# Patient Record
Sex: FEMALE | Race: ASIAN | Employment: OTHER | URBAN - METROPOLITAN AREA
[De-identification: names, ages, dates, MRNs, and addresses within clinical notes are randomized per-mention and may not be internally consistent; named-entity substitution may affect disease eponyms.]

---

## 2020-08-19 ENCOUNTER — APPOINTMENT (OUTPATIENT)
Dept: GENERAL RADIOLOGY | Facility: CLINIC | Age: 80
DRG: 286 | End: 2020-08-19
Attending: EMERGENCY MEDICINE
Payer: COMMERCIAL

## 2020-08-19 ENCOUNTER — HOSPITAL ENCOUNTER (INPATIENT)
Facility: CLINIC | Age: 80
LOS: 8 days | Discharge: HOME HEALTH | DRG: 286 | End: 2020-08-27
Attending: EMERGENCY MEDICINE | Admitting: INTERNAL MEDICINE
Payer: COMMERCIAL

## 2020-08-19 ENCOUNTER — APPOINTMENT (OUTPATIENT)
Dept: ULTRASOUND IMAGING | Facility: CLINIC | Age: 80
DRG: 286 | End: 2020-08-19
Attending: PHYSICIAN ASSISTANT
Payer: COMMERCIAL

## 2020-08-19 DIAGNOSIS — I42.8 OTHER CARDIOMYOPATHY (H): Primary | ICD-10-CM

## 2020-08-19 DIAGNOSIS — I42.8 OTHER CARDIOMYOPATHY (H): ICD-10-CM

## 2020-08-19 DIAGNOSIS — I50.9 ACUTE HEART FAILURE, UNSPECIFIED HEART FAILURE TYPE (H): ICD-10-CM

## 2020-08-19 DIAGNOSIS — I48.91 ATRIAL FIBRILLATION, UNSPECIFIED TYPE (H): ICD-10-CM

## 2020-08-19 DIAGNOSIS — I48.91 ATRIAL FIBRILLATION WITH RVR (H): ICD-10-CM

## 2020-08-19 LAB
ANION GAP SERPL CALCULATED.3IONS-SCNC: 11 MMOL/L (ref 3–14)
BASOPHILS # BLD AUTO: 0 10E9/L (ref 0–0.2)
BASOPHILS NFR BLD AUTO: 0.5 %
BUN SERPL-MCNC: 17 MG/DL (ref 7–30)
CALCIUM SERPL-MCNC: 9 MG/DL (ref 8.5–10.1)
CHLORIDE SERPL-SCNC: 105 MMOL/L (ref 94–109)
CO2 SERPL-SCNC: 22 MMOL/L (ref 20–32)
CREAT SERPL-MCNC: 1.18 MG/DL (ref 0.52–1.04)
DIFFERENTIAL METHOD BLD: ABNORMAL
EOSINOPHIL # BLD AUTO: 0.1 10E9/L (ref 0–0.7)
EOSINOPHIL NFR BLD AUTO: 1.1 %
ERYTHROCYTE [DISTWIDTH] IN BLOOD BY AUTOMATED COUNT: 14.8 % (ref 10–15)
GFR SERPL CREATININE-BSD FRML MDRD: 44 ML/MIN/{1.73_M2}
GLUCOSE SERPL-MCNC: 93 MG/DL (ref 70–99)
HCT VFR BLD AUTO: 41.7 % (ref 35–47)
HGB BLD-MCNC: 13.1 G/DL (ref 11.7–15.7)
IMM GRANULOCYTES # BLD: 0 10E9/L (ref 0–0.4)
IMM GRANULOCYTES NFR BLD: 0.3 %
INTERPRETATION ECG - MUSE: NORMAL
LMWH PPP CHRO-ACNC: 0.41 IU/ML
LYMPHOCYTES # BLD AUTO: 0.9 10E9/L (ref 0.8–5.3)
LYMPHOCYTES NFR BLD AUTO: 14.3 %
MCH RBC QN AUTO: 28.9 PG (ref 26.5–33)
MCHC RBC AUTO-ENTMCNC: 31.4 G/DL (ref 31.5–36.5)
MCV RBC AUTO: 92 FL (ref 78–100)
MONOCYTES # BLD AUTO: 0.5 10E9/L (ref 0–1.3)
MONOCYTES NFR BLD AUTO: 7.9 %
NEUTROPHILS # BLD AUTO: 4.8 10E9/L (ref 1.6–8.3)
NEUTROPHILS NFR BLD AUTO: 75.9 %
NRBC # BLD AUTO: 0 10*3/UL
NRBC BLD AUTO-RTO: 0 /100
NT-PROBNP SERPL-MCNC: ABNORMAL PG/ML (ref 0–1800)
PLATELET # BLD AUTO: 181 10E9/L (ref 150–450)
POTASSIUM SERPL-SCNC: 4.2 MMOL/L (ref 3.4–5.3)
RBC # BLD AUTO: 4.53 10E12/L (ref 3.8–5.2)
SARS-COV-2 RNA SPEC QL NAA+PROBE: NORMAL
SODIUM SERPL-SCNC: 138 MMOL/L (ref 133–144)
SPECIMEN SOURCE: NORMAL
TROPONIN I SERPL-MCNC: 0.29 UG/L (ref 0–0.04)
TROPONIN I SERPL-MCNC: 0.3 UG/L (ref 0–0.04)
TROPONIN I SERPL-MCNC: 0.3 UG/L (ref 0–0.04)
WBC # BLD AUTO: 6.4 10E9/L (ref 4–11)

## 2020-08-19 PROCEDURE — 83880 ASSAY OF NATRIURETIC PEPTIDE: CPT | Performed by: EMERGENCY MEDICINE

## 2020-08-19 PROCEDURE — 93970 EXTREMITY STUDY: CPT

## 2020-08-19 PROCEDURE — 96374 THER/PROPH/DIAG INJ IV PUSH: CPT

## 2020-08-19 PROCEDURE — 25000132 ZZH RX MED GY IP 250 OP 250 PS 637: Performed by: INTERNAL MEDICINE

## 2020-08-19 PROCEDURE — 36415 COLL VENOUS BLD VENIPUNCTURE: CPT | Performed by: PHYSICIAN ASSISTANT

## 2020-08-19 PROCEDURE — 96375 TX/PRO/DX INJ NEW DRUG ADDON: CPT

## 2020-08-19 PROCEDURE — 99285 EMERGENCY DEPT VISIT HI MDM: CPT | Mod: 25

## 2020-08-19 PROCEDURE — 12000000 ZZH R&B MED SURG/OB

## 2020-08-19 PROCEDURE — 80048 BASIC METABOLIC PNL TOTAL CA: CPT | Performed by: EMERGENCY MEDICINE

## 2020-08-19 PROCEDURE — 25000128 H RX IP 250 OP 636: Performed by: PHYSICIAN ASSISTANT

## 2020-08-19 PROCEDURE — 85520 HEPARIN ASSAY: CPT | Performed by: INTERNAL MEDICINE

## 2020-08-19 PROCEDURE — 25000128 H RX IP 250 OP 636: Performed by: INTERNAL MEDICINE

## 2020-08-19 PROCEDURE — 99223 1ST HOSP IP/OBS HIGH 75: CPT | Mod: AI | Performed by: INTERNAL MEDICINE

## 2020-08-19 PROCEDURE — 84484 ASSAY OF TROPONIN QUANT: CPT | Performed by: PHYSICIAN ASSISTANT

## 2020-08-19 PROCEDURE — 36415 COLL VENOUS BLD VENIPUNCTURE: CPT | Performed by: INTERNAL MEDICINE

## 2020-08-19 PROCEDURE — 25000125 ZZHC RX 250: Performed by: EMERGENCY MEDICINE

## 2020-08-19 PROCEDURE — 25000128 H RX IP 250 OP 636: Performed by: EMERGENCY MEDICINE

## 2020-08-19 PROCEDURE — 25000132 ZZH RX MED GY IP 250 OP 250 PS 637: Performed by: PHYSICIAN ASSISTANT

## 2020-08-19 PROCEDURE — 71045 X-RAY EXAM CHEST 1 VIEW: CPT

## 2020-08-19 PROCEDURE — 93005 ELECTROCARDIOGRAM TRACING: CPT

## 2020-08-19 PROCEDURE — 99222 1ST HOSP IP/OBS MODERATE 55: CPT | Performed by: INTERNAL MEDICINE

## 2020-08-19 PROCEDURE — U0003 INFECTIOUS AGENT DETECTION BY NUCLEIC ACID (DNA OR RNA); SEVERE ACUTE RESPIRATORY SYNDROME CORONAVIRUS 2 (SARS-COV-2) (CORONAVIRUS DISEASE [COVID-19]), AMPLIFIED PROBE TECHNIQUE, MAKING USE OF HIGH THROUGHPUT TECHNOLOGIES AS DESCRIBED BY CMS-2020-01-R: HCPCS | Performed by: EMERGENCY MEDICINE

## 2020-08-19 PROCEDURE — 84484 ASSAY OF TROPONIN QUANT: CPT | Performed by: EMERGENCY MEDICINE

## 2020-08-19 PROCEDURE — C9803 HOPD COVID-19 SPEC COLLECT: HCPCS

## 2020-08-19 PROCEDURE — 99207 ZZC APP CREDIT; MD BILLING SHARED VISIT: CPT | Performed by: PHYSICIAN ASSISTANT

## 2020-08-19 PROCEDURE — 85025 COMPLETE CBC W/AUTO DIFF WBC: CPT | Performed by: EMERGENCY MEDICINE

## 2020-08-19 PROCEDURE — 25000132 ZZH RX MED GY IP 250 OP 250 PS 637: Performed by: EMERGENCY MEDICINE

## 2020-08-19 RX ORDER — FUROSEMIDE 10 MG/ML
40 INJECTION INTRAMUSCULAR; INTRAVENOUS EVERY 12 HOURS
Status: DISCONTINUED | OUTPATIENT
Start: 2020-08-19 | End: 2020-08-21

## 2020-08-19 RX ORDER — POTASSIUM CHLORIDE 7.45 MG/ML
10 INJECTION INTRAVENOUS
Status: DISCONTINUED | OUTPATIENT
Start: 2020-08-19 | End: 2020-08-22

## 2020-08-19 RX ORDER — METOPROLOL TARTRATE 1 MG/ML
2.5 INJECTION, SOLUTION INTRAVENOUS ONCE
Status: DISCONTINUED | OUTPATIENT
Start: 2020-08-19 | End: 2020-08-19

## 2020-08-19 RX ORDER — ATORVASTATIN CALCIUM 80 MG/1
80 TABLET, FILM COATED ORAL EVERY EVENING
COMMUNITY

## 2020-08-19 RX ORDER — ASPIRIN 325 MG
325 TABLET ORAL ONCE
Status: COMPLETED | OUTPATIENT
Start: 2020-08-19 | End: 2020-08-19

## 2020-08-19 RX ORDER — HYDROCHLOROTHIAZIDE 12.5 MG/1
12.5 CAPSULE ORAL EVERY EVENING
Status: ON HOLD | COMMUNITY
End: 2020-08-27

## 2020-08-19 RX ORDER — METOPROLOL TARTRATE 1 MG/ML
2.5 INJECTION, SOLUTION INTRAVENOUS ONCE
Status: COMPLETED | OUTPATIENT
Start: 2020-08-19 | End: 2020-08-19

## 2020-08-19 RX ORDER — NALOXONE HYDROCHLORIDE 0.4 MG/ML
.1-.4 INJECTION, SOLUTION INTRAMUSCULAR; INTRAVENOUS; SUBCUTANEOUS
Status: DISCONTINUED | OUTPATIENT
Start: 2020-08-19 | End: 2020-08-26

## 2020-08-19 RX ORDER — POTASSIUM CL/LIDO/0.9 % NACL 10MEQ/0.1L
10 INTRAVENOUS SOLUTION, PIGGYBACK (ML) INTRAVENOUS
Status: DISCONTINUED | OUTPATIENT
Start: 2020-08-19 | End: 2020-08-22

## 2020-08-19 RX ORDER — METOPROLOL TARTRATE 25 MG/1
25 TABLET, FILM COATED ORAL 2 TIMES DAILY
Status: DISCONTINUED | OUTPATIENT
Start: 2020-08-19 | End: 2020-08-19

## 2020-08-19 RX ORDER — ATORVASTATIN CALCIUM 40 MG/1
80 TABLET, FILM COATED ORAL EVERY EVENING
Status: DISCONTINUED | OUTPATIENT
Start: 2020-08-19 | End: 2020-08-27 | Stop reason: HOSPADM

## 2020-08-19 RX ORDER — LOSARTAN POTASSIUM 100 MG/1
100 TABLET ORAL EVERY EVENING
Status: DISCONTINUED | OUTPATIENT
Start: 2020-08-19 | End: 2020-08-21

## 2020-08-19 RX ORDER — FUROSEMIDE 10 MG/ML
40 INJECTION INTRAMUSCULAR; INTRAVENOUS ONCE
Status: COMPLETED | OUTPATIENT
Start: 2020-08-19 | End: 2020-08-19

## 2020-08-19 RX ORDER — POLYETHYLENE GLYCOL 3350 17 G/17G
17 POWDER, FOR SOLUTION ORAL DAILY PRN
Status: DISCONTINUED | OUTPATIENT
Start: 2020-08-19 | End: 2020-08-27 | Stop reason: HOSPADM

## 2020-08-19 RX ORDER — CLOPIDOGREL BISULFATE 75 MG/1
75 TABLET ORAL DAILY
Status: DISCONTINUED | OUTPATIENT
Start: 2020-08-19 | End: 2020-08-19

## 2020-08-19 RX ORDER — POTASSIUM CHLORIDE 29.8 MG/ML
20 INJECTION INTRAVENOUS
Status: DISCONTINUED | OUTPATIENT
Start: 2020-08-19 | End: 2020-08-22

## 2020-08-19 RX ORDER — METOPROLOL SUCCINATE 25 MG/1
25 TABLET, EXTENDED RELEASE ORAL 2 TIMES DAILY
Status: DISCONTINUED | OUTPATIENT
Start: 2020-08-19 | End: 2020-08-25

## 2020-08-19 RX ORDER — ONDANSETRON 2 MG/ML
4 INJECTION INTRAMUSCULAR; INTRAVENOUS EVERY 6 HOURS PRN
Status: DISCONTINUED | OUTPATIENT
Start: 2020-08-19 | End: 2020-08-27 | Stop reason: HOSPADM

## 2020-08-19 RX ORDER — AMOXICILLIN 250 MG
2 CAPSULE ORAL 2 TIMES DAILY PRN
Status: DISCONTINUED | OUTPATIENT
Start: 2020-08-19 | End: 2020-08-27 | Stop reason: HOSPADM

## 2020-08-19 RX ORDER — ASPIRIN 81 MG/1
81 TABLET ORAL DAILY
COMMUNITY

## 2020-08-19 RX ORDER — FLUTICASONE PROPIONATE 50 MCG
2 SPRAY, SUSPENSION (ML) NASAL DAILY
COMMUNITY

## 2020-08-19 RX ORDER — AMOXICILLIN 250 MG
1 CAPSULE ORAL 2 TIMES DAILY PRN
Status: DISCONTINUED | OUTPATIENT
Start: 2020-08-19 | End: 2020-08-27 | Stop reason: HOSPADM

## 2020-08-19 RX ORDER — CLOPIDOGREL BISULFATE 75 MG/1
75 TABLET ORAL DAILY
Status: ON HOLD | COMMUNITY
End: 2020-08-27

## 2020-08-19 RX ORDER — HEPARIN SODIUM 10000 [USP'U]/100ML
350 INJECTION, SOLUTION INTRAVENOUS CONTINUOUS
Status: DISPENSED | OUTPATIENT
Start: 2020-08-19 | End: 2020-08-26

## 2020-08-19 RX ORDER — METOPROLOL TARTRATE 1 MG/ML
5 INJECTION, SOLUTION INTRAVENOUS EVERY 4 HOURS PRN
Status: DISCONTINUED | OUTPATIENT
Start: 2020-08-19 | End: 2020-08-27 | Stop reason: HOSPADM

## 2020-08-19 RX ORDER — LIDOCAINE 40 MG/G
CREAM TOPICAL
Status: DISCONTINUED | OUTPATIENT
Start: 2020-08-19 | End: 2020-08-26

## 2020-08-19 RX ORDER — POTASSIUM CHLORIDE 1.5 G/1.58G
20-40 POWDER, FOR SOLUTION ORAL
Status: DISCONTINUED | OUTPATIENT
Start: 2020-08-19 | End: 2020-08-22

## 2020-08-19 RX ORDER — ASPIRIN 81 MG/1
81 TABLET ORAL DAILY
Status: DISCONTINUED | OUTPATIENT
Start: 2020-08-20 | End: 2020-08-27 | Stop reason: HOSPADM

## 2020-08-19 RX ORDER — ASCORBIC ACID 500 MG
500 TABLET ORAL DAILY
COMMUNITY

## 2020-08-19 RX ORDER — LOSARTAN POTASSIUM 100 MG/1
100 TABLET ORAL EVERY EVENING
Status: ON HOLD | COMMUNITY
End: 2020-08-27

## 2020-08-19 RX ORDER — ACETAMINOPHEN 325 MG/1
325-650 TABLET ORAL EVERY 6 HOURS PRN
COMMUNITY

## 2020-08-19 RX ORDER — METOPROLOL SUCCINATE 25 MG/1
25 TABLET, EXTENDED RELEASE ORAL EVERY EVENING
Status: ON HOLD | COMMUNITY
End: 2020-08-27

## 2020-08-19 RX ORDER — ACETAMINOPHEN 325 MG/1
650 TABLET ORAL EVERY 4 HOURS PRN
Status: DISCONTINUED | OUTPATIENT
Start: 2020-08-19 | End: 2020-08-22

## 2020-08-19 RX ORDER — ONDANSETRON 4 MG/1
4 TABLET, ORALLY DISINTEGRATING ORAL EVERY 6 HOURS PRN
Status: DISCONTINUED | OUTPATIENT
Start: 2020-08-19 | End: 2020-08-27 | Stop reason: HOSPADM

## 2020-08-19 RX ORDER — POTASSIUM CHLORIDE 1500 MG/1
20-40 TABLET, EXTENDED RELEASE ORAL
Status: DISCONTINUED | OUTPATIENT
Start: 2020-08-19 | End: 2020-08-22

## 2020-08-19 RX ADMIN — Medication 12.5 MG: at 16:34

## 2020-08-19 RX ADMIN — FUROSEMIDE 40 MG: 10 INJECTION, SOLUTION INTRAMUSCULAR; INTRAVENOUS at 22:14

## 2020-08-19 RX ADMIN — FUROSEMIDE 40 MG: 10 INJECTION, SOLUTION INTRAMUSCULAR; INTRAVENOUS at 12:02

## 2020-08-19 RX ADMIN — ACETAMINOPHEN 650 MG: 325 TABLET, FILM COATED ORAL at 19:09

## 2020-08-19 RX ADMIN — Medication 3000 UNITS: at 13:41

## 2020-08-19 RX ADMIN — METOPROLOL SUCCINATE 25 MG: 25 TABLET, EXTENDED RELEASE ORAL at 22:14

## 2020-08-19 RX ADMIN — ASPIRIN 325 MG ORAL TABLET 325 MG: 325 PILL ORAL at 12:02

## 2020-08-19 RX ADMIN — METOPROLOL TARTRATE 2.5 MG: 5 INJECTION INTRAVENOUS at 12:02

## 2020-08-19 RX ADMIN — ATORVASTATIN CALCIUM 80 MG: 40 TABLET, FILM COATED ORAL at 19:09

## 2020-08-19 RX ADMIN — LOSARTAN POTASSIUM 100 MG: 100 TABLET, FILM COATED ORAL at 19:09

## 2020-08-19 RX ADMIN — HEPARIN SODIUM 600 UNITS/HR: 10000 INJECTION, SOLUTION INTRAVENOUS at 13:41

## 2020-08-19 ASSESSMENT — ENCOUNTER SYMPTOMS
SORE THROAT: 1
SHORTNESS OF BREATH: 1
COUGH: 1
FEVER: 0

## 2020-08-19 ASSESSMENT — ACTIVITIES OF DAILY LIVING (ADL)
ADLS_ACUITY_SCORE: 15
ADLS_ACUITY_SCORE: 15

## 2020-08-19 ASSESSMENT — MIFFLIN-ST. JEOR: SCORE: 1053.5

## 2020-08-19 NOTE — ED NOTES
Assisted Pt. In using commode. Pt. Not lightheaded or dizzy. Pt. Very short of breath with transition from bed to commode and back to bed. Pt. O2 sats at 88% on room air. Once Pt. Back in bed and resting Pt. O2 sats came up to 97%. Applied monitoring devices (EKG, BP, and pulse ox) onto Patient. RN aware

## 2020-08-19 NOTE — CONSULTS
Consult Date:  08/19/2020      CARDIOLOGY CONSULTATION      REQUESTING PHYSICIAN:  Hospitalist Service.      INDICATION FOR CARDIAC CONSULTATION:  Atrial fibrillation, new onset; congestive heart failure.      Dear Doctor:        It is my pleasure to see your patient, Noelle Mullins, who is normally followed at [facility] for cardiology care.  This is a patient who underwent coronary artery bypass grafting in 2010.  She had a LIMA to the LAD, a radial graft to the distal right coronary artery and a vein graft to the obtuse marginal artery.  She had stenting of a high-grade stenosis in the proximal right coronary artery in 2017.  Her last echocardiogram that I can see is also from 2017.  This showed that the patient had normal left ventricular size and systolic function, with an EF of 55%.  She also has moderate concentric left ventricular hypertrophy.  This is also a patient with peripheral arterial disease with bilateral iliac stents.  She also has significant essential hypertension.  The patient has chronic left upper sternal border chest discomfort to palpation.      With that background in mind, the patient has been complaining of increasing lower extremity edema, with the left lower extremity swelling first, followed by the right lower extremity and increasing shortness of breath for 1 week's time.  The right lower extremity began to swell in the last 2 days.  She has noticed her heart has been beating rapidly.  She is not sure how long that has been going on for, but maybe 2 weeks.  She does not notice irregularity of heartbeat.  The patient came into the emergency room and was found to be in atrial fibrillation with rapid ventricular response.  The ventricular rate was 130 beats per minute with PVCs.  The patient has not been complaining of any chest pains or any chest pressure.  The shortness of breath is mainly on exertion.  The first troponin was raised at 0.289.  The proBNP was markedly  raised at 10,189.      Chest x-ray showed mild to moderate left pleural effusion, underlying left base consolidation and mild pulmonary vascular congestion.      PAST MEDICAL HISTORY:  Coronary artery bypass grafting as described above in 2010, stenting of the right coronary artery in 2017,  hypertension, peripheral arterial disease with bilateral iliac artery stenting, gastroesophageal reflux disease, history of supraventricular tachycardia.      PAST SURGICAL HISTORY:  Coronary artery bypass grafting.      FAMILY HISTORY:  Father had hypertension and stroke.  Grandmother had hypertension and stroke.      SOCIAL HISTORY:  She is a former smoker, and quit in 2000.      MEDICATIONS:  Aspirin 325 mg per day, atorvastatin 80 mg per day, clopidogrel 75 mg per day, furosemide 40 mg every 12 hours, intravenous heparin bolus plus infusion, losartan 100 mg every evening, metoprolol tartrate 25 mg twice a day.      ALLERGIES:  SHE HAS COUGH WITH LISINOPRIL AND NONSPECIFIC INTOLERANCE TO OXYCODONE.      REVIEW OF SYSTEMS:   CONSTITUTIONAL:  Negative.   EYES:  Negative.   ENT:  Negative.   CARDIOVASCULAR:  As above.   RESPIRATORY:  As above.   GASTROINTESTINAL:  Nil.   GENITOURINARY:  Nil.   MUSCULOSKELETAL:  Nil.   NEUROLOGICAL:  Nil.   PSYCHIATRIC:  Nil.   ENDOCRINE:  Nil.   HEMATOLYMPHATIC:  Nil.   ALLERGY AND IMMUNOLOGY:  As above.      PHYSICAL EXAMINATION:   GENERAL:  She is a pleasant, elderly patient in no apparent distress.   VITAL SIGNS:  Blood pressure is 121/105.  Her pulse is 101 beats per minute, atrial fibrillation.  Temperature is 98.2.  Respirations are 20.  O2 sats are 97%.   HEENT:  The patient is wearing glasses.  Her pupils are equal.  She has normal facial symmetry.   NECK:  Jugular venous pulse is just visible above the clavicle bilaterally and is borderline raised.  Her carotids are normal with no bruits.  She has a median sternotomy scar and the sternum is stable.   HEART:  Riddle beat is not displaced.   Heart sounds 1 variable, heart sound 2 normal.  No murmurs heard.   CHEST:  Reveals bibasilar crackles.   ABDOMEN:  Unremarkable with no organomegaly.  She has no tenderness.   EXTREMITIES:  She has bilateral lower extremity edema, left greater than the right.  The edema is 2+ at least bilaterally.  There is erythema in the lower extremities.   NEUROLOGIC:  She moves all 4 limbs appropriately with no obvious sensory or motor loss.  She is fully oriented to time, place and person with a pleasant affect.      LABORATORY INVESTIGATIONS:  Sodium is 138, potassium is 4.2, BUN is 17, creatinine is 1.18, GFR is 44.  ProBNP is raised at 10,189.  First troponin raised at 0.289.  White cell count 6.4, hemoglobin 13.1, platelet count is 181,000.      IMPRESSION:   1.  Newly diagnosed atrial fibrillation with rapid ventricular response with uncertain onset and duration.   2.  Congestive heart failure.  This is most likely related to the atrial fibrillation with rapid ventricular response.   3.  Known coronary artery disease and status post coronary artery bypass grafting in 2010 and stenting of the proximal right coronary artery in 2017.   4.  History of essential hypertension.   5.  He is on dual antiplatelet therapy for unknown reasons.      PLAN:     1.  We will try to get the patient rate controlled and I fully agree with anticoagulating with heparin.  I would switch the metoprolol tartrate to metoprolol succinate, which tends to give a longer and better control of heart rate.  We will to try to titrate the beta blocker to control the heart rate and hopefully we will have enough blood pressure to do that.   2.  We will await the results of the echocardiogram to determine left ventricular size and systolic function.  Previously, her EF was normal in 2018.  I fully agree with diuresis as the patient is clearly in congestive heart failure both clinically and based upon chest x-ray and serology with a significantly raised  proBNP.     3.  Finally, if the patient is on dual antiplatelet therapy for the coronary arteries, I would stop the Plavix medication.  However, if the patient is on the Plavix for peripheral arterial disease, I might consider stopping it, but the patient will be on long-term anticoagulation, so I suspect that we would not need the Plavix medication under those circumstances.  Her risk of bleeding is significantly raised being on both aspirin, Plavix and an anticoagulant.      Many thanks for allowing me to be involved in the care of this very nice patient.  We will follow closely.         KAILEY ESCALERA MD, [facility]             D: 2020   T: 2020   MT: BENITO      Name:     ALYX OLIVER   MRN:      [id number]        Account:       [id number]   :      1940           Consult Date:  2020      Document: [id number]       cc: Teresita Espino MD

## 2020-08-19 NOTE — ED NOTES
[facility]  ED Nurse Handoff Report    Noelle Mullins is a 79 year old female   ED Chief complaint: Leg Swelling  . ED Diagnosis:   Final diagnoses:   Atrial fibrillation, unspecified type (H)     Allergies:   Allergies   Allergen Reactions     Lisinopril Cough     Oxycodone        Code Status: Full Code  Activity level - Baseline/Home:  Assist X 1. Activity Level - Current:   Assist X 1. Lift room needed: No. Bariatric: No   Needed: Yes   Isolation: Yes. Infection: Not Applicable.     Vital Signs:   Vitals:    08/19/20 1200 08/19/20 1215 08/19/20 1230 08/19/20 1248   BP: (!) 139/119 (!) 139/116 (!) 147/122    Pulse: 126 99 102    Resp:       Temp:       TempSrc:       SpO2: 97% 94% 97%    Weight:    65.2 kg (143 lb 11.8 oz)   Height:    1.524 m (5')       Cardiac Rhythm:  ,      Pain level:    Patient confused: No. Patient Falls Risk: Yes.   Elimination Status: Has voided   Patient Report - Initial Complaint: Shortness of breath . Focused Assessment: cardiac   Tests Performed:   Labs Ordered and Resulted from Time of ED Arrival Up to the Time of Departure from the ED   CBC WITH PLATELETS DIFFERENTIAL - Abnormal; Notable for the following components:       Result Value    MCHC 31.4 (*)     All other components within normal limits   BASIC METABOLIC PANEL - Abnormal; Notable for the following components:    Creatinine 1.18 (*)     GFR Estimate 44 (*)     GFR Estimate If Black 51 (*)     All other components within normal limits   NT PROBNP INPATIENT - Abnormal; Notable for the following components:    N-Terminal Pro BNP Inpatient 10,189 (*)     All other components within normal limits   TROPONIN I - Abnormal; Notable for the following components:    Troponin I ES 0.289 (*)     All other components within normal limits   COVID-19 VIRUS (CORONAVIRUS) BY PCR   MEASURE WEIGHT   NOTIFY PHYSICIAN   NOTIFY PHYSICIAN   PLATELETS MONITORED PER HEPARIN TREATMENT PROTOCOL (FOR MEANINGFUL USE   .  Abnormal Results: see above .   Treatments provided: ekg labs cardiac monitoring , iv , meds   Family Comments: daughter at bedside aware of admission   OBS brochure/video discussed/provided to patient:  Yes  ED Medications:   Medications   metoprolol (LOPRESSOR) injection 2.5 mg (has no administration in time range)   metoprolol (LOPRESSOR) injection 2.5 mg (2.5 mg Intravenous Given 8/19/20 1202)   furosemide (LASIX) injection 40 mg (40 mg Intravenous Given 8/19/20 1202)   aspirin (ASA) tablet 325 mg (325 mg Oral Given 8/19/20 1202)     Drips infusing:  No  For the majority of the shift, the patient's behavior Green. Interventions performed were labs chest xray , lasix , asa metoprolol .    Sepsis treatment initiated: no       ED Nurse Name/Phone Number: Savita Kraft RN,   12:50 PM      RECEIVING UNIT ED HANDOFF REVIEW    Above ED Nurse Handoff Report was reviewed: Yes  Reviewed by: Princess Whittington RN on August 19, 2020 at 1:28 PM

## 2020-08-19 NOTE — ED NOTES
Patient a/o speaks philipino , speaks and understands some english, very pleasant , fluid overload pt. Feet have plus 3 edema , lungs have crackles bilaterally  desats on room air to high 90's with activity however recovers easily  To sats >90 .

## 2020-08-19 NOTE — ED TRIAGE NOTES
Daughter brings patient in after being at clinic for SOB, sore throat, cough, & leg swelling for 1 week. ABC's intact.

## 2020-08-19 NOTE — PROGRESS NOTES
Physician Attestation   I, Ramirez Andrews, saw and evaluated Noelle Mullins as part of a shared visit.  I have reviewed and discussed with the advanced practice provider their history, physical and plan.    I personally reviewed the vital signs, medications, labs and imaging.    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  Non-toxic and comfortable w/ normal work of breathing on exam.    Key management decisions made by me:     1.  Acute CHF, possible ACS: last tte from 2017 showed EF of 55% w/ LVH (care everywhere)  --agree w/ admission for IV diuresis, heparin gtt, serial troponins and echo as well as cardiology consultation.  --currently chest pain free.   --metoprolol as at home, continue ASA 81 mg, plavix 75 mg, atorvastatin and losartan.   --trend troponins  --monitor on tele    2.  COVID rule out: Patient here with volume overload, 3+ LE edema and pulmonary edema with elevated bnp.  Given clear alternate etiology (CHF) ok to remove special covid isolation and treat swab as asymptomatic from my standpoint.    3.  A-fib: will start metoprolol tartrate 25 mg BID.  Was on toprol XL 25 mg once daily at home.  Titrate.  Revisit AC moving forward.      Ramirez Andrews  Date of Service (when I saw the patient): 08/19/20

## 2020-08-19 NOTE — PROGRESS NOTES
Cardiology consult dictated.  79-year-old patient admitted with symptoms suggestive of congestive heart failure with increasing ankle edema and dyspnea on exertion.  Patient was found to be in atrial fibrillation with rapid ventricular response.  The symptoms have been going on for approximately 2 weeks.  Patient is normally followed at [facility] cardiology.  She has a past history of coronary artery bypass grafting in 2010 with a LIMA to the LAD and a radial graft to the right coronary artery and a saphenous vein graft to the obtuse marginal artery.  All the grafts were open in 2017.  She had stenting to her right coronary artery in 2017.  In 2018 her ejection fraction was normal.  Patient is not complaining of any chest discomfort.  Her troponin is borderline raised at 0.28.  Patient has been started on intravenous heparin and intravenous diuretics.  Her chest x-ray suggested congestive heart failure with pleural effusions and pulmonary vascular congestion.  We are awaiting the results of the echocardiogram.  This patient has peripheral arterial disease with bilateral iliac artery stenting.  Patient is already on dual antiplatelet therapy and I suspect that that is due to peripheral arterial disease.  She will require long-term anticoagulation and the risk of bleeding in the 79-year-old patient being on triple therapy would be significant.  So for that reason we will stop the Plavix medication.  I agree with the use of metoprolol but I would switch to the succinate form for longer acting and smoother rate control and that can be titrated up so long as blood pressure allows and we will continue with the intravenous heparin and switch over to an oral anticoagulant, probably Eliquis when the investigations have been completed.  We will complete the troponin series also.  Will follow.

## 2020-08-19 NOTE — Clinical Note
The DP pulses are 1+ bilaterally. The PT pulses are 1+ bilaterally. The femoral pulses are 1+ bilaterally.

## 2020-08-19 NOTE — H&P
History and Physical     Noelle Mullins MRN# [id number]   YOB: 1940 Age: 79 year old      Date of Admission:  8/19/2020    Primary care provider: Teresita Espino          Assessment and Plan:   Noelle Mullins is a 79 year old female with a PMH significant for CAD s/p CABG in 2011, pSVT, GERD, PVD s/p stent to R common iliac artery, HTN, HLP, who presents with SOB and LE edema.     1. Acute CHF, likely diastolic due to a fib with RVR - 1 week of LE edema, pt reports edema in left leg first, then right. SOB for past week as well, notes heart racing, denies chest pain or pleuritic chest pain. Notes orthopnea since CABG. EKG a fib with RVR, HRs 120s, BNP significantly elevated at 10,000, troponin elevated but likely demand. CXR shows mild-moderate left pleural effusion with underlying left base consolidation/collapse which could simply represent atelectasis, mild pulmonary vascular congestion. HR mildly improved with 2.5 mg IV metoprolol, will continue PRN IV 5mg metoprolol, continue heparin gtt. Will diurese with 40 mg IV lasix BID (lasix naive). Daily weights, strict I&Os. Given new CHF, will get an echocardiogram and consult cardiology. Resume home oral metoprolol as well once med rec is complete. Given initial asymmetric LE edema, will get LE US to rule out DVT.   2. Elevated troponin - likely demand ischemia from a fib and CHF. Denies chest pain although has a hx of CAD (below). Will monitor on tele and trend troponins. Heparin started for above.   3. CAD, s/p 3v CABG in 2010, PCI to RCA in 2017 -pt denies chest pain, SOB due to above. Will get echocardiogram per above and trend troponins. Resume home BB, plavix, ARB and statin.   4. HTN - resume home meds with parameters once med rec is complete, appears to be on losartan, hydrochlorothiazide and metoprolol.   5. HLP - resume home statin  6. PVD - R common iliac stenosis, s/p stent. On Plavix, hold for now as she will be on heparin  for #1.   7. Covid-19 - symptomatic due to cough and SOB. Low suspicion, remove precautions if negative. Pending.     Prophylaxis - on heparin gtt  Code status - Full Code  Dispo - admit to inpatient                Chief Complaint:   SOB, LE edema         History of Present Illness:   Noelle Mullins is a 79 year old female with a PMH significant for CAD s/p CABG in 2011, pSVT, GERD, PVD s/p stent to R common iliac artery, HTN, HLP, who presents with SOB and LE edema. Pt reports 1 week hx of increasing SOB and LE edema. She notes that she feels her heart is racing for the past couple days. She denies chest pain but notes mild cough. She denies fever, chills, abd pain, nausea, vomiting, diarrhea or dysuria. She endorses orthopnea since her CABG many years ago and sleeps with her head propped up. She denies LE pain. She denies sick contacts or recent travel.   In the ED, tachycardic in the 100-130s/ VS otherwise stable, no hypoxia. BMP is significant for mild FATOU with Cr of 1.18, otherwise unremarkable. BNP is significantly elevated at [address], troponin is elevated at 0.289. CBC is unremarkable. EKG shows a fib with RVR. CXR shows mild-moderate left pleural effusion with underlying left base consolidation/collapse which could simply represent atelectasis, mild pulmonary vascular congestion. She received 325 mg PO ASA, 40 mg IV lasix and 2.5 mg IV metoprolol. She will be admitted for further management of a fib and CHF.     Hx obtained by speaking with ED physician, chart review and pt interview.               Past Medical History:   HTN  HLP  CAD  PVD  GERD  pSVT          Past Surgical History:   3v CABG          Social History:     Social History     Socioeconomic History     Marital status:      Spouse name: Not on file     Number of children: Not on file     Years of education: Not on file     Highest education level: Not on file   Occupational History     Not on file   Social Needs     Financial resource  strain: Not on file     Food insecurity     Worry: Not on file     Inability: Not on file     Transportation needs     Medical: Not on file     Non-medical: Not on file   Tobacco Use     Smoking status: Not on file   Substance and Sexual Activity     Alcohol use: Not on file     Drug use: Not on file     Sexual activity: Not on file   Lifestyle     Physical activity     Days per week: Not on file     Minutes per session: Not on file     Stress: Not on file   Relationships     Social connections     Talks on phone: Not on file     Gets together: Not on file     Attends Zoroastrian service: Not on file     Active member of club or organization: Not on file     Attends meetings of clubs or organizations: Not on file     Relationship status: Not on file     Intimate partner violence     Fear of current or ex partner: Not on file     Emotionally abused: Not on file     Physically abused: Not on file     Forced sexual activity: Not on file   Other Topics Concern     Not on file   Social History Narrative     Not on file     Former smoker - quit in 2000          Family History:   Father - HTN  Grandmother - HTN, CVA         Allergies:      Allergies   Allergen Reactions     Lisinopril Cough     Oxycodone                Medications:     Prior to Admission medications    Not on File     Med rec pending         Review of Systems:   A Comprehensive greater than 10 system review of systems was carried out.  Pertinent positives and negatives are noted above.  Otherwise negative for contributory information.     Review Of Systems  Skin: negative  Eyes: negative  Ears/Nose/Throat: negative  Respiratory: No shortness of breath, dyspnea on exertion, cough, or hemoptysis  Cardiovascular: negative  Gastrointestinal: negative  Genitourinary: negative  Musculoskeletal: negative  Neurologic: negative  Psychiatric: negative  Hematologic/Lymphatic/Immunologic: negative  Endocrine: negative             Physical Exam:   Blood pressure (!)  151/111, pulse 137, temperature 98.2  F (36.8  C), temperature source Oral, resp. rate 18, SpO2 97 %.  Exam:  GENERAL:  Comfortable.  PSYCH: pleasant, oriented, No acute distress.  HEENT:  Atraumatic, normocephalic. Normal conjunctiva, normal hearing, and oropharynx is normal.  NECK:  Supple, no neck vein distention  HEART:  Normal S1, S2 with no murmur, no pericardial rub, gallops or S3 or S4.  LUNGS:  Bibasilar crackles, otherwise clear to auscultation, normal Respiratory effort. No wheezing  GI:  Soft, normal bowel sounds. Non-tender, non distended.   EXTREMITIES:  2+ bilateral LE edema, +2 pulses bilateral and equal.  SKIN:  Dry to touch, No rash, wound or ulcerations.  NEUROLOGIC:  CN 2-12 intact, BL 5/5 symmetric upper and lower extremity strength, sensation is intact with no focal deficits.               Data:     Recent Labs   Lab 08/19/20  1110   NTBNPI 10,189*     Recent Labs   Lab 08/19/20  1110   WBC 6.4   HGB 13.1   HCT 41.7   MCV 92        Recent Labs   Lab 08/19/20  1110      POTASSIUM 4.2   CHLORIDE 105   CO2 22   ANIONGAP 11   GLC 93   BUN 17   CR 1.18*   GFRESTIMATED 44*   GFRESTBLACK 51*   TORRI 9.0     Recent Labs   Lab 08/19/20  1110   TROPI 0.289*       Recent Results (from the past 48 hour(s))   XR Chest Port 1 View    Narrative    CHEST PORTABLE ONE VIEW 8/19/2020 11:08 AM     HISTORY: Shortness of breath.     COMPARISON: None available    FINDINGS: Sternotomy changes. Aortic calcification. Cardiomegaly.      Impression    IMPRESSION: Mild-moderate left pleural effusion. Underlying left base  consolidation/collapse which could simply represent atelectasis. Mild  pulmonary vascular congestion.    MD Guera FELIZ PA-C    This patient was seen and discussed with Dr. Andrews who agrees with the current plans as outlined above.

## 2020-08-19 NOTE — ED PROVIDER NOTES
History     Chief Complaint:  Leg Swelling    HPI   Noelle Mullins is a 79 year old female with a history of PAD who presents with shortness of breath and leg swelling.  This is been going on for about a week.  She has never had this symptom before.  She denies any cough but says she has a sore throat.  She had chest pain a few days ago but does not have any chest pain today.  Denies any fevers.    Allergies:  Lisinopril  Oxycodone     Medications:    Losartan  Plavix  Lipitor  Toprol  Hydrochlorothiazide     Past Medical History:    Vitreomacular traction, left  NSTEMI  Paroxysmal SVT  CAD  Osteoarthritis  Chronic ischemic heart disease  Cervical radiculopathy  GERD  Degenerative joint disease, right shoulder  Pseudophakia  PVD  Hypertension  Hyperlipidemia   PAD     Past Surgical History:    Cholecystectomy  Tonsillectomy  Appendectomy  Coronary artery bypass graft  Cataract removal x2  C section  Rotator cuff repair  ORIF     Family History:    Hypertension - father    Social History:  Smoking status: quit 2001  Smokeless tobacco: no  Alcohol use: no  Drug use: no  Marital Status:       Review of Systems   Constitutional: Negative for fever.   HENT: Positive for sore throat.    Respiratory: Positive for cough and shortness of breath.    Cardiovascular: Positive for chest pain and leg swelling.   All other systems reviewed and are negative.    Physical Exam     Patient Vitals for the past 24 hrs:   BP Temp Temp src Pulse Resp SpO2 Height Weight   08/19/20 1248 -- -- -- -- -- -- 1.524 m (5') 65.2 kg (143 lb 11.8 oz)   08/19/20 1230 (!) 147/122 -- -- 102 -- 97 % -- --   08/19/20 1215 (!) 139/116 -- -- 99 -- 94 % -- --   08/19/20 1200 (!) 139/119 -- -- 126 -- 97 % -- --   08/19/20 1145 (!) 151/111 -- -- 137 -- 97 % -- --   08/19/20 1130 (!) 148/118 -- -- 121 -- 96 % -- --   08/19/20 1115 (!) 149/122 -- -- 138 -- 97 % -- --   08/19/20 1100 (!) 139/122 -- -- 118 -- 98 % -- --   08/19/20 1045 (!) 130/106 -- --  142 -- 97 % -- --   08/19/20 1024 (!) 145/82 98.2  F (36.8  C) Oral 62 18 100 % -- --       Physical Exam  General: Patient is alert and interactive when I enter the room  Head:  The scalp, face, and head appear normal  Eyes:  Conjunctivae are normal  ENT:    The nose is normal    Pinnae are normal    External acoustic canals are normal  Neck:  Trachea midline  CV:  Pulses are normal, irregularly irregular  Resp:  No respiratory distress, CTAB, decreased left breath sounds, scattered wheezing    Abdomen:      Soft, non-tender, non-distended  Musc:  Normal muscular tone    No major joint effusions    No asymmetric leg swelling    Bilaterally pitting edema, 2+   Skin:  No rash or lesions noted  Neuro:  Speech is normal and fluent. Face is symmetric.     Moving all extremities well.   Psych: Awake. Alert.  Normal affect.  Appropriate interactions.      Emergency Department Course   ECG (10:37:40):  Rate 130 bpm. NM interval *. QRS duration 74. QT/QTc 274/403. P-R-T axes * 52 237. Atrial fibrillation with rapid ventricular response with premature ventricular or aberrantly conducted complexes. Possible anterior infarct, age undetermined. Abnormal ECG. Interpreted by Mckenna Petersen MD.    Imaging:  Radiology findings were communicated with the patient and daughter who voiced understanding of the findings.    XR Chest Port 1 View  IMPRESSION: Mild-moderate left pleural effusion. Underlying left base  consolidation/collapse which could simply represent atelectasis. Mild  pulmonary vascular congestion.  As read by Radiology.    Laboratory:  Laboratory findings were communicated with the patient and daughter who voiced understanding of the findings.    Troponin I 1110: 0.289(HH)    BNP: 12708(H)    BMP: Creatinine 1.18(H), GFR Estimate 44(L), o/w WNL    CBC: WNL (WBC 6.4, HGB 13.1, )     Symptomatic Covid-19 PCR: Pending    Interventions:  1202 Lopressor 2.5 mg IV  1202 Lasix 40 mg IV  1202 aspirin 325 mg  PO    Emergency Department Course:  Past medical records, nursing notes, and vitals reviewed.  1027: I performed an exam of the patient and obtained history, as documented above.     EKG was obtained and reviewed in the emergency department.    IV inserted and blood drawn.    The patient was sent for a chest XR while in the emergency department, results above.     1203: I rechecked the patient. I reviewed the results with the Patient and daughter and answered all related questions prior to admission.     Findings and plan explained to the Patient and daughter who consents to admission. Discussed the patient with Dr. Andrews, who will admit the patient to a cardiac telemetry bed for further monitoring, evaluation, and treatment.  Impression & Plan   Medical Decision Making:  Noelle Mullins is a 79-year-old female with history of PAD who presents with shortness of breath and leg swelling for the past week.  She went to urgent care today and was found to be in atrial fibrillation with RVR.  She is not hypoxic but does have a decreased breath on the left.  Heart rate is mainly in the 130s.  She does have a history of an abnormal heart rhythm however she does have a history of SVT so I suspect that this is likely her abnormal heart rhythm.  I do not find a history of atrial fibrillation.  She does have a history of chronic ischemic systolic heart failure however her last echo many years ago showed a normal ejection fracture.  She did have a cath as well during that time.  She reports he had chest pain a few days ago but is currently chest pain-free.  EKG showed no ischemic changes.  Her troponin was elevated 0.248.  We did give her metoprolol as she is on a beta-blocker did not take it today and she likely has early exacerbation possibly brought on by her atrial fibrillation uncontrolled.  She is currently not a candidate for cardioversion.  She is not hypotensive.  Patient was given IV metoprolol with  improvement of her rate.  Her BNP is 10,000 and her chest x-ray reveals a moderate left-sided pleural effusion.  She denies any fever although has a sore throat so we will do a COVID swab.  I think her atrial fibrillation and likely heart failure is more the etiology of her presentation today.  Patient was also given IV Lasix.  Patient admitted to cardiac telemetry in stable condition.    Covid-19  Noelle Mullins was evaluated during a global COVID-19 pandemic, which necessitated consideration that the patient might be at risk for infection with the SARS-CoV-2 virus that causes COVID-19.   Applicable protocols for evaluation were followed during the patient's care.   COVID-19 was considered as part of the patient's evaluation. The plan for testing is:  a test was obtained during this visit.    Diagnosis:    ICD-10-CM   1. Atrial fibrillation, unspecified type (H)  I48.91     Disposition:  Admitted to cardiac telemtery.    Fernando Mccracken  8/19/2020   [facility] EMERGENCY DEPARTMENT    Scribe Disclosure:  Fernando CAMILO, am serving as a scribe at 10:28 AM on 8/19/2020 to document services personally performed by Mckenna Petersen MD based on my observations and the provider's statements to me.          Mckenna Petersen MD  08/19/20 7121

## 2020-08-19 NOTE — PHARMACY-ADMISSION MEDICATION HISTORY
Admission medication history interview status for this patient is complete. See [facility] admission navigator for allergy information, prior to admission medications and immunization status.     Medication history interview done via telephone during Covid-19 pandemic, indicate source(s): Patient  Medication history resources (including written lists, pill bottles, clinic record):care everywhere, chart review  Pharmacy: [facility], [place]  Changes made to PTA medication list:  Added: all  Deleted:    Changed:      Actions taken by pharmacist (provider contacted, etc):None     Additional medication history information:None    Medication reconciliation/reorder completed by provider prior to medication history?  N  (Y/N)     For patients on insulin therapy: N  (Y/N)      Prior to Admission medications    Medication Sig Last Dose Taking? Auth Provider   acetaminophen (TYLENOL) 325 MG tablet Take 325-650 mg by mouth every 6 hours as needed for mild pain  Yes Unknown, Entered By History   aspirin 81 MG EC tablet Take 81 mg by mouth daily 8/18/2020 at PM Yes Unknown, Entered By History   atorvastatin (LIPITOR) 80 MG tablet Take 80 mg by mouth every evening 8/18/2020 at pm Yes Unknown, Entered By History   clopidogrel (PLAVIX) 75 MG tablet Take 75 mg by mouth daily 8/18/2020 at PM Yes Unknown, Entered By History   fluticasone (FLONASE) 50 MCG/ACT nasal spray Spray 2 sprays into both nostrils daily 8/18/2020 at Unknown time Yes Unknown, Entered By History   hydrochlorothiazide (MICROZIDE) 12.5 MG capsule Take 12.5 mg by mouth every evening 8/18/2020 at PM Yes Unknown, Entered By History   losartan (COZAAR) 100 MG tablet Take 100 mg by mouth every evening 8/18/2020 at PM Yes Unknown, Entered By History   metoprolol succinate ER (TOPROL-XL) 25 MG 24 hr tablet Take 25 mg by mouth every evening 8/18/2020 at PM Yes Unknown, Entered By History   vitamin C (ASCORBIC ACID) 500 MG tablet Take 500 mg by mouth daily 8/18/2020 at PM Yes  Unknown, Entered By History

## 2020-08-20 ENCOUNTER — APPOINTMENT (OUTPATIENT)
Dept: CARDIOLOGY | Facility: CLINIC | Age: 80
DRG: 286 | End: 2020-08-20
Attending: PHYSICIAN ASSISTANT
Payer: COMMERCIAL

## 2020-08-20 ENCOUNTER — APPOINTMENT (OUTPATIENT)
Dept: OCCUPATIONAL THERAPY | Facility: CLINIC | Age: 80
DRG: 286 | End: 2020-08-20
Attending: PHYSICIAN ASSISTANT
Payer: COMMERCIAL

## 2020-08-20 ENCOUNTER — CARE COORDINATION (OUTPATIENT)
Dept: CARDIOLOGY | Facility: CLINIC | Age: 80
End: 2020-08-20

## 2020-08-20 PROBLEM — I42.8 OTHER CARDIOMYOPATHY (H): Status: ACTIVE | Noted: 2020-08-19

## 2020-08-20 LAB
ALBUMIN SERPL-MCNC: 3.5 G/DL (ref 3.4–5)
ALP SERPL-CCNC: 79 U/L (ref 40–150)
ALT SERPL W P-5'-P-CCNC: 20 U/L (ref 0–50)
ANION GAP SERPL CALCULATED.3IONS-SCNC: 10 MMOL/L (ref 3–14)
AST SERPL W P-5'-P-CCNC: 29 U/L (ref 0–45)
BASOPHILS # BLD AUTO: 0 10E9/L (ref 0–0.2)
BASOPHILS NFR BLD AUTO: 0.7 %
BILIRUB SERPL-MCNC: 3.3 MG/DL (ref 0.2–1.3)
BUN SERPL-MCNC: 18 MG/DL (ref 7–30)
CALCIUM SERPL-MCNC: 8.6 MG/DL (ref 8.5–10.1)
CHLORIDE SERPL-SCNC: 101 MMOL/L (ref 94–109)
CO2 SERPL-SCNC: 28 MMOL/L (ref 20–32)
CREAT SERPL-MCNC: 1.34 MG/DL (ref 0.52–1.04)
DIFFERENTIAL METHOD BLD: ABNORMAL
EOSINOPHIL # BLD AUTO: 0.1 10E9/L (ref 0–0.7)
EOSINOPHIL NFR BLD AUTO: 2.8 %
ERYTHROCYTE [DISTWIDTH] IN BLOOD BY AUTOMATED COUNT: 15 % (ref 10–15)
GFR SERPL CREATININE-BSD FRML MDRD: 38 ML/MIN/{1.73_M2}
GLUCOSE SERPL-MCNC: 84 MG/DL (ref 70–99)
HCT VFR BLD AUTO: 41.7 % (ref 35–47)
HGB BLD-MCNC: 13 G/DL (ref 11.7–15.7)
IMM GRANULOCYTES # BLD: 0 10E9/L (ref 0–0.4)
IMM GRANULOCYTES NFR BLD: 0.4 %
LABORATORY COMMENT REPORT: NORMAL
LMWH PPP CHRO-ACNC: 0.24 IU/ML
LMWH PPP CHRO-ACNC: 0.47 IU/ML
LYMPHOCYTES # BLD AUTO: 1.1 10E9/L (ref 0.8–5.3)
LYMPHOCYTES NFR BLD AUTO: 23.6 %
MAGNESIUM SERPL-MCNC: 1.8 MG/DL (ref 1.6–2.3)
MCH RBC QN AUTO: 28.8 PG (ref 26.5–33)
MCHC RBC AUTO-ENTMCNC: 31.2 G/DL (ref 31.5–36.5)
MCV RBC AUTO: 92 FL (ref 78–100)
MONOCYTES # BLD AUTO: 0.4 10E9/L (ref 0–1.3)
MONOCYTES NFR BLD AUTO: 9.5 %
NEUTROPHILS # BLD AUTO: 2.9 10E9/L (ref 1.6–8.3)
NEUTROPHILS NFR BLD AUTO: 63 %
NRBC # BLD AUTO: 0 10*3/UL
NRBC BLD AUTO-RTO: 0 /100
PLATELET # BLD AUTO: 182 10E9/L (ref 150–450)
POTASSIUM SERPL-SCNC: 3.3 MMOL/L (ref 3.4–5.3)
POTASSIUM SERPL-SCNC: 4.3 MMOL/L (ref 3.4–5.3)
PROT SERPL-MCNC: 7 G/DL (ref 6.8–8.8)
RBC # BLD AUTO: 4.52 10E12/L (ref 3.8–5.2)
SARS-COV-2 RNA SPEC QL NAA+PROBE: NEGATIVE
SODIUM SERPL-SCNC: 139 MMOL/L (ref 133–144)
SPECIMEN SOURCE: NORMAL
WBC # BLD AUTO: 4.6 10E9/L (ref 4–11)

## 2020-08-20 PROCEDURE — 99233 SBSQ HOSP IP/OBS HIGH 50: CPT | Performed by: INTERNAL MEDICINE

## 2020-08-20 PROCEDURE — 97165 OT EVAL LOW COMPLEX 30 MIN: CPT | Mod: GO

## 2020-08-20 PROCEDURE — 99233 SBSQ HOSP IP/OBS HIGH 50: CPT | Mod: 25 | Performed by: INTERNAL MEDICINE

## 2020-08-20 PROCEDURE — 25000128 H RX IP 250 OP 636: Performed by: INTERNAL MEDICINE

## 2020-08-20 PROCEDURE — 84132 ASSAY OF SERUM POTASSIUM: CPT | Performed by: INTERNAL MEDICINE

## 2020-08-20 PROCEDURE — 80053 COMPREHEN METABOLIC PANEL: CPT | Performed by: INTERNAL MEDICINE

## 2020-08-20 PROCEDURE — 25000132 ZZH RX MED GY IP 250 OP 250 PS 637: Performed by: INTERNAL MEDICINE

## 2020-08-20 PROCEDURE — 12000000 ZZH R&B MED SURG/OB

## 2020-08-20 PROCEDURE — 25000128 H RX IP 250 OP 636: Performed by: PHYSICIAN ASSISTANT

## 2020-08-20 PROCEDURE — 85520 HEPARIN ASSAY: CPT | Performed by: INTERNAL MEDICINE

## 2020-08-20 PROCEDURE — 93306 TTE W/DOPPLER COMPLETE: CPT | Mod: 26 | Performed by: INTERNAL MEDICINE

## 2020-08-20 PROCEDURE — 83735 ASSAY OF MAGNESIUM: CPT | Performed by: INTERNAL MEDICINE

## 2020-08-20 PROCEDURE — 25000132 ZZH RX MED GY IP 250 OP 250 PS 637: Performed by: PHYSICIAN ASSISTANT

## 2020-08-20 PROCEDURE — 85025 COMPLETE CBC W/AUTO DIFF WBC: CPT | Performed by: INTERNAL MEDICINE

## 2020-08-20 PROCEDURE — 97530 THERAPEUTIC ACTIVITIES: CPT | Mod: GO

## 2020-08-20 PROCEDURE — 97535 SELF CARE MNGMENT TRAINING: CPT | Mod: GO

## 2020-08-20 PROCEDURE — 25500064 ZZH RX 255 OP 636: Performed by: INTERNAL MEDICINE

## 2020-08-20 PROCEDURE — 36415 COLL VENOUS BLD VENIPUNCTURE: CPT | Performed by: INTERNAL MEDICINE

## 2020-08-20 RX ORDER — POTASSIUM CHLORIDE 1.5 G/1.58G
20-40 POWDER, FOR SOLUTION ORAL
Status: DISCONTINUED | OUTPATIENT
Start: 2020-08-20 | End: 2020-08-26

## 2020-08-20 RX ORDER — MAGNESIUM SULFATE HEPTAHYDRATE 40 MG/ML
2 INJECTION, SOLUTION INTRAVENOUS DAILY PRN
Status: DISCONTINUED | OUTPATIENT
Start: 2020-08-20 | End: 2020-08-27 | Stop reason: HOSPADM

## 2020-08-20 RX ORDER — CYCLOBENZAPRINE HCL 5 MG
10 TABLET ORAL EVERY 8 HOURS PRN
Status: DISCONTINUED | OUTPATIENT
Start: 2020-08-20 | End: 2020-08-27 | Stop reason: HOSPADM

## 2020-08-20 RX ORDER — POTASSIUM CHLORIDE 1500 MG/1
20-40 TABLET, EXTENDED RELEASE ORAL
Status: DISCONTINUED | OUTPATIENT
Start: 2020-08-20 | End: 2020-08-26

## 2020-08-20 RX ORDER — POTASSIUM CHLORIDE 7.45 MG/ML
10 INJECTION INTRAVENOUS
Status: DISCONTINUED | OUTPATIENT
Start: 2020-08-20 | End: 2020-08-26

## 2020-08-20 RX ORDER — MAGNESIUM SULFATE HEPTAHYDRATE 40 MG/ML
4 INJECTION, SOLUTION INTRAVENOUS EVERY 4 HOURS PRN
Status: DISCONTINUED | OUTPATIENT
Start: 2020-08-20 | End: 2020-08-27 | Stop reason: HOSPADM

## 2020-08-20 RX ORDER — POTASSIUM CL/LIDO/0.9 % NACL 10MEQ/0.1L
10 INTRAVENOUS SOLUTION, PIGGYBACK (ML) INTRAVENOUS
Status: DISCONTINUED | OUTPATIENT
Start: 2020-08-20 | End: 2020-08-26

## 2020-08-20 RX ORDER — POTASSIUM CHLORIDE 29.8 MG/ML
20 INJECTION INTRAVENOUS
Status: DISCONTINUED | OUTPATIENT
Start: 2020-08-20 | End: 2020-08-26

## 2020-08-20 RX ADMIN — ASPIRIN 81 MG: 81 TABLET, COATED ORAL at 07:42

## 2020-08-20 RX ADMIN — METOPROLOL SUCCINATE 25 MG: 25 TABLET, EXTENDED RELEASE ORAL at 07:42

## 2020-08-20 RX ADMIN — HUMAN ALBUMIN MICROSPHERES AND PERFLUTREN 3 ML: 10; .22 INJECTION, SOLUTION INTRAVENOUS at 11:34

## 2020-08-20 RX ADMIN — FUROSEMIDE 40 MG: 10 INJECTION, SOLUTION INTRAMUSCULAR; INTRAVENOUS at 12:27

## 2020-08-20 RX ADMIN — ACETAMINOPHEN 650 MG: 325 TABLET, FILM COATED ORAL at 12:28

## 2020-08-20 RX ADMIN — METOPROLOL SUCCINATE 25 MG: 25 TABLET, EXTENDED RELEASE ORAL at 21:59

## 2020-08-20 RX ADMIN — FUROSEMIDE 40 MG: 10 INJECTION, SOLUTION INTRAMUSCULAR; INTRAVENOUS at 21:59

## 2020-08-20 RX ADMIN — ATORVASTATIN CALCIUM 80 MG: 40 TABLET, FILM COATED ORAL at 21:56

## 2020-08-20 RX ADMIN — POTASSIUM CHLORIDE 20 MEQ: 1.5 POWDER, FOR SOLUTION ORAL at 06:40

## 2020-08-20 RX ADMIN — POTASSIUM CHLORIDE 40 MEQ: 1.5 POWDER, FOR SOLUTION ORAL at 04:40

## 2020-08-20 ASSESSMENT — ACTIVITIES OF DAILY LIVING (ADL)
ADLS_ACUITY_SCORE: 15
PREVIOUS_RESPONSIBILITIES: HOUSEKEEPING;LAUNDRY;MEDICATION MANAGEMENT
ADLS_ACUITY_SCORE: 15

## 2020-08-20 NOTE — PROGRESS NOTES
08/20/20 0902   Quick Adds   Type of Visit Initial Occupational Therapy Evaluation   Living Environment   Lives With child(yariel), adult  (daughter)   Living Arrangements house   Home Accessibility stairs to enter home;stairs within home   Number of Stairs, Main Entrance 4   Number of Stairs, Within Home, Primary other (see comments)  (15)   Transportation Anticipated family or friend will provide   Living Environment Comment Pt lives with daughter/daughter's family in house, stairs to enter/within, pt's room is in lower level, tub/shower with shower chair   Self-Care   Usual Activity Tolerance moderate   Current Activity Tolerance fair   Equipment Currently Used at Home walker, rolling   Functional Level   Ambulation 0-->independent   Transferring 0-->independent   Toileting 0-->independent   Bathing 0-->independent   Dressing 0-->independent   Eating 0-->independent   Communication 0-->understands/communicates without difficulty   Swallowing 0-->swallows foods/liquids without difficulty   Cognition 0 - no cognition issues reported   Fall history within last six months no   Which of the above functional risks had a recent onset or change? transferring;ambulation;toileting;bathing;dressing   Prior Functional Level Comment Pt reports indep in all ADLs, med mgmt, light housekeeping and mobility tasks with use of 4ww for long distance walking (ie to the park); w/c for appointments. Family assists with IADLs.    General Information   Onset of Illness/Injury or Date of Surgery - Date 08/19/20   Referring Physician Guera Mckeon, PADorianC    Patient/Family Goals Statement Pt's goal is to d/c home   Additional Occupational Profile Info/Pertinent History of Current Problem Per chart: Pt is a 79 year old female admitted with symptoms suggestive of congestive heart failure with increasing ankle edema and dyspnea on exertion.  Patient was found to be in atrial fibrillation with rapid ventricular response.      Precautions/Limitations fall precautions   Pain Assessment   Patient Currently in Pain No   Strength   Strength Comments Generalized weakness BUEs   Transfer Skill: Bed to Chair/Chair to Bed   Level of [place]: Bed to Chair contact guard   Physical Assist/Nonphysical Assist: Bed to Chair 1 person assist   Transfer Skill: Sit to Stand   Level of [place]: Sit/Stand contact guard   Physical Assist/Nonphysical Assist: Sit/Stand 1 person assist   Transfer Skill: Toilet Transfer   Level of [place]: Toilet contact guard   Physical Assist/Nonphysical Assist: Toilet 1 person assist   Balance   Balance Comments CGA provided while in stance, utilized IV pole for steadying support   Lower Body Dressing   Level of [place]: Dress Lower Body minimum assist (75% patients effort)   Physical Assist/Nonphysical Assist: Dress Lower Body 1 person assist   Toileting   Level of [place]: Toilet minimum assist (75% patients effort)   Physical Assist/Nonphysical Assist: Toilet 1 person assist   Grooming   Level of [place]: Grooming contact guard   Physical Assist/Nonphysical Assist: Grooming 1 person assist   Instrumental Activities of Daily Living (IADL)   Previous Responsibilities housekeeping;laundry;medication management   IADL Comments Has support of family for IADLs   Activities of Daily Living Analysis   Impairments Contributing to Impaired Activities of Daily Living strength decreased   ADL Comments Pt presents to OT below baseline level of functioning with regards to ADLs   General Therapy Interventions   Planned Therapy Interventions ADL retraining;IADL retraining;strengthening;transfer training   Clinical Impression   Criteria for Skilled Therapeutic Interventions Met yes, treatment indicated   OT Diagnosis Impaired ADLs, IADLs and mobility tasks   Influenced by the following impairments Decreased functional activity tolerance and strength, SOB   Assessment of Occupational Performance 5 or more  "Performance Deficits   Identified Performance Deficits Bathing, dressing, grooming, toileting, transfers   Clinical Decision Making (Complexity) Low complexity   Therapy Frequency Daily   Predicted Duration of Therapy Intervention (days/wks) 4 days   Anticipated Discharge Disposition Home with Assist;Home with Home Therapy   Risks and Benefits of Treatment have been explained. Yes   Patient, Family & other staff in agreement with plan of care Yes   Clinical Impression Comments Pt would benefit from skilled OT to maximize safety and indep in all ADLs, IADLs and mobility tasks due to current deficits impacting function    [facility] AM-PAC  \"6 Clicks\" Daily Activity Inpatient Short Form   1. Putting on and taking off regular lower body clothing? 2 - A Lot   2. Bathing (including washing, rinsing, drying)? 2 - A Lot   3. Toileting, which includes using toilet, bedpan or urinal? 3 - A Little   4. Putting on and taking off regular upper body clothing? 3 - A Little   5. Taking care of personal grooming such as brushing teeth? 3 - A Little   6. Eating meals? 4 - None   Daily Activity Raw Score (Score out of 24.Lower scores equate to lower levels of function) 17   Total Evaluation Time   Total Evaluation Time (Minutes) 10     "

## 2020-08-20 NOTE — PLAN OF CARE
Heart Failure Care Map  GOALS TO BE MET BEFORE DISCHARGE:    1. Decrease congestion and/or edema with diuretic therapy to achieve near optimal volume status.     Dyspnea improved: No, further care required to meet this goal. Please explain pt still has fluid to remove   Edema improved: No, further care required to meet this goal. Please explain not recieving IV lasix        Net I/O and Weights since admission:   07/21 0700 - 08/20 0659  In: 240 [P.O.:240]  Out: 1025 [Urine:1025]  Net: -785     Vitals:    08/19/20 1248   Weight: 65.2 kg (143 lb 11.8 oz)       2.  O2 sats > 92% on room air, or at prior home O2 therapy level.      Able to wean O2 this shift to keep sats above 92%?: Yes, satisfactory for discharge.   Does patient use Home O2? No          Current oxygenation status:   SpO2: 98 %     O2 Device: None (Room air),      3.  Tolerates ambulation and mobility near baseline.     Ambulation: Yes, satisfactory for discharge.   Times patient ambulated with staff this shift: 1    Please review the Heart Failure Care Map for additional HF goal outcomes.    SEVEN CODY, RN  8/19/2020     Pt admitted with Afib VVR and CHF. Pt started on metoprolol.  Pt complains of SOB. BLE edema. IV lasix. VSS. Hep gtt maintained. Hep 10a at 0400. Trop 0.301 and 0.305. Cards following. Plan for echo tomorrow. Will continue to monitor.

## 2020-08-20 NOTE — PROGRESS NOTES
"SPIRITUAL HEALTH SERVICES Progress Note  FR Med Surg 3 Telecare Visit    Spiritual Health visit per routine admission hospital  request.    Pt is alert, pleasant and conversant. Goes by \"Sravanthi\" (pron Samson).  Shared context of admission, concerns about her heart and states she is \"Feeling much much better. I know it is all in God's hands\".    Sravanthi enjoyed engaging in life review about her children and grandchildren and currently lives with her daughter in [place].  She has five sisters, one in [place] and another in [place]. The other two are \"back home in the [facility]\"     Sravanthi is Jainism and member of [employer] in [place]. 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   Prayer requested and provided.    Oriented to [facility].      Plan: Spiritual Health Services remains available for additional emotional/spiritual support.    Agustin Carrizales MA  Staff   Pager: [phone]  Phone: [phone]         "

## 2020-08-20 NOTE — UTILIZATION REVIEW
Admission Status; Secondary Review Determination       Under the authority of the Utilization Management Committee, the utilization review process indicated a secondary review on the above patient. The review outcome is based on review of the medical records, discussions with staff, and applying clinical experience noted on the date of the review.     (x) Inpatient Status Appropriate - This patient's medical care is consistent with medical management for inpatient care and reasonable inpatient medical practice.     RATIONALE FOR DETERMINATION   79 y.o. [place] woman who speaks good English and has PMH including NSTEMI, CABG, SVT among others who presented 8/19/20 presents w/ 3+ LE edema and dyspnea found to have a-fib w/ RVR, bnp elevated at 10,189 and troponin elevation of 0.289 w/ CXR showing mild to moderate left pleural effusion and mild pulmonary vascular congestion.  She was admitted for IV diuretics, further work-up including echocardiogram and cardiology consultation and was empirically started on a heparin drip. The expected length of stay at the time of admission was more than 2 nights because of the severity of illness, intensity of service provided, and risk for adverse outcome. Inpatient admission is appropriate.     This document was produced using voice recognition software       The information on this document is developed by the utilization review team in order for the business office to ensure compliance. This only denotes the appropriateness of proper admission status and does not reflect the quality of care rendered.   The definitions of Inpatient Status and Observation Status used in making the determination above are those provided in the CMS Coverage Manual, Chapter 1 and Chapter 6, section 70.4.   Sincerely,   ANG WINTERS MD   System Medical Director   Utilization Management   [facility].

## 2020-08-20 NOTE — PROGRESS NOTES
Daughter called back asking who the provider would be so she can let her mom's insurance know to see if they are in network.     Called daughter back & left VM stating appt would be with VAMSI Hoang who they saw in the hospital.    I also updated her on [facility] in [place] has cardiology services and gave her the number to the clinic.   Asked her to call back for an update as to what she would like pt to do.   Fariha Mendiola RN 2:18 PM 08/20/20

## 2020-08-20 NOTE — PROGRESS NOTES
[facility]  Hospitalist Progress Note  Ramirez Andrews MD 08/20/20    Reason for Stay (Diagnosis): CHF         Assessment and Plan:      Summary of Stay: Noelle Mullins is a very pleasant 79 y.o. [place] woman who speaks good English and has PMH including NSTEMI, CABG, SVT among others who presented 8/19/20 presents w/ 3+ LE edema and dyspnea found to have a-fib w/ RVR, bnp elevated at 10,189 and troponin elevation of 0.289 w/ CXR showing mild to moderate left pleural effusion and mild pulmonary vascular congestion.      She was admitted for IV diuretics, further work-up including echocardiogram and cardiology consultation and was empirically started on a heparin drip.  Her A. fib has been rate controlled with metoprolol.     1.  Acute CHF, troponin elevation which I suspect is demand ischemia rather than ACS: last tte from 2017 showed EF of 55% w/ LVH (care everywhere)  --Cardiology following.  Agree with ongoing IV diuresis, heparin gtt and echo.  --Remains chest pain free.   --metoprolol as at home but increased to Toprol-XL 25 mg twice daily from once daily  --continue ASA 81 mg, holding home Plavix 75 mg while on heparin drip.  Likely will need to avoid triple anti-coagulant therapy/antiplatelet therapy in this elderly woman to avoid high bleeding risk  --Continue atorvastatin and losartan.   --monitor on tele     2.  COVID rule out: Swab negative.  Obvious alternate etiology for her presentation.     3.  A-fib w/ RVR: Rate control with Toprol-XL 25 mg twice daily.  Was on toprol XL 25 mg once daily at home.  Titrate.  Revisit AC moving forward pending cardiac work-up.    4.  3+ lower extremity edema: With absence of significant hypoxia.  I do think that lymphedema wraps would be helpful in this situation.  Consult ordered.    5.  History of PAD: Continue aspirin, holding Plavix while on therapeutic anticoagulation    6.   Known coronary artery disease and status post coronary  artery bypass grafting in 2010 and stenting of the proximal right coronary artery in 2017.     DVT Prophylaxis: Currently on heparin drip  Code Status: Full Code  Discharge Dispo/Date: Likely at least a couple more days here for IV diuresis        Interval History (Subjective):      No chest pain  Diuresing  Echo pending  troponins flat                    Physical Exam:      Last Vital Signs:  /83 (BP Location: Left arm)   Pulse 92   Temp 96.7  F (35.9  C) (Oral)   Resp 18   Ht 1.524 m (5')   Wt 65.2 kg (143 lb 11.8 oz)   SpO2 98%   BMI 28.07 kg/m      I/O last 3 completed shifts:  In: 240 [P.O.:240]  Out: 1975 [Urine:1975]    General: Alert, awake, no acute distress.  HEENT: NC/AT, eyes anicteric, external occular movements intact, face symmetric. .  Cardiac: Normal rate though irregularly irregular.    Pulmonary: Normal chest rise, normal work of breathing.  Lungs CTA BL  Abdomen: soft, non-tender, non-distended.  Bowel Sounds Present.  No guarding.  Extremities: 3+ lower extremity edema otherwise no deformities.  Warm, well perfused.  Skin: no rashes or lesions noted.  Warm and Dry.  Neuro: No focal deficits noted.  Speech clear.  Coordination and strength grossly normal.  Psych: Appropriate affect.         Medications:      All current medications were reviewed with changes reflected in problem list.         Data:      All new lab and imaging data was reviewed.   Labs:  Recent Labs   Lab 08/20/20  1044 08/20/20  0359   NA  --  139   POTASSIUM 4.3 3.3*   CHLORIDE  --  101   CO2  --  28   ANIONGAP  --  10   GLC  --  84   BUN  --  18   CR  --  1.34*   GFRESTIMATED  --  38*   GFRESTBLACK  --  44*   TORRI  --  8.6     Recent Labs   Lab 08/20/20  0359   WBC 4.6   HGB 13.0   HCT 41.7   MCV 92         Imaging:   Results for orders placed or performed during the hospital encounter of 08/19/20   XR Chest Port 1 View    Narrative    CHEST PORTABLE ONE VIEW 8/19/2020 11:08 AM     HISTORY: Shortness of  breath.     COMPARISON: None available    FINDINGS: Sternotomy changes. Aortic calcification. Cardiomegaly.      Impression    IMPRESSION: Mild-moderate left pleural effusion. Underlying left base  consolidation/collapse which could simply represent atelectasis. Mild  pulmonary vascular congestion.    FELICIA RICHARDS MD   US Lower Extremity Venous Duplex Bilateral    Narrative    VENOUS ULTRASOUND BILATERAL LOWER EXTREMITIES  8/19/2020 3:28 PM     HISTORY: Edema, rule out DVT.    COMPARISON: None.    TECHNIQUE: Color Doppler and spectral waveform analysis performed  throughout the deep veins of both lower extremities.    FINDINGS: Both common femoral, proximal greater saphenous, femoral,  and popliteal veins demonstrate normal blood flow, compression, and  augmentation. Posterior tibial and peroneal veins are compressible.      Impression    IMPRESSION: Negative for deep venous thrombosis in both lower  extremities.    MD Ramirez LONG MD , MD.

## 2020-08-20 NOTE — PLAN OF CARE
Pt A/O x 4, VSS, pt denies pain, headache, dizziness, & N/V. Pt dyspneic upon exertion. Pt up SBA w/gait belt & walker. Tele: Afib, RVR. Lung sounds diminished/clear, RA; productive cough. Hep gtt @ 4.5 mL/hr. Potassium level 3.3 - replaced overnight; recheck level 4.3. ECHO, IV Lasix. BLE edema +2/+3. Cards & CORE consulted. Will continue with plan of care.    MD added Lymphedema Therapy consult.

## 2020-08-20 NOTE — PROVIDER NOTIFICATION
just letting you know trop increased to 0.305. hep gtt maintained. No reports of CP    Addendum: no new orders

## 2020-08-20 NOTE — DISCHARGE INSTRUCTIONS
Your home care referral was sent to [facility]  If you haven't heard from them within the next 24-48 hours,  Please call them at [phone]    Your hospital follow up appointment has been scheduled for you with Dr. Espino at [facility] for September 2nd at 1:00pm. Please bring your hospital discharge instructions, a photo ID, and insurance information with you to your appointment. Please call the clinic at [phone] if you need to reschedule.       Your Cardiology follow up appointment has been scheduled for you with Dr. Alba Harris at [facility] for Tuesday, September 15th at 1:25 pm. Please bring your hospital discharge instructions, a photo ID, and insurance information with you to your appointment. Please call the clinic at [phone] if you need to reschedule.

## 2020-08-20 NOTE — PROGRESS NOTES
Received CORE consult yesterday per VAMSI Roblero. Pt does follow with [facility] per Care Everywhere.      Called and spoke to daughter, Yeimi. She said she would be interested in having pt transfer care to us as we are closer to their home. She will call pt's insurance to see if we are in network and call back to schedule an appt if we are.   Fariha Mendiola RN 1:22 PM 08/20/20

## 2020-08-20 NOTE — PROVIDER NOTIFICATION
MD paged: Just letting you know pt coughing up a small amount of alfonso red blood. Pt is on hep gtt.    Addendum: no new orders

## 2020-08-20 NOTE — PROGRESS NOTES
[facility]  Cardiology Progress Note    Outpatient cardiologist: [facility]     Date of Service (when I saw the patient): 08/20/2020    Summary: Noelle Mullins is a 79 year old female with history of coronary artery bypass grafting in 2010 with a LIMA to the LAD and a radial graft to the right coronary artery and a saphenous vein graft to the obtuse marginal artery.  All the grafts were open in 2017.  She had stenting to her right coronary artery in 2017.  In 2018 her ejection fraction was normal. Also PAD with bilateral iliac stenting, HTN, who was admitted on 8/19/2020 with GRACIA and edema x 2 weeks, consistent with CHF.  Found to be in afib with RVR.       Interval History   Tele: afib, rate 80-90s, occ PVC    No CP.  SOB improving but still SOB with talking or moving.   Legs swollen.   Getting leg cramps.        Assessment & Plan   New afib with RVR  - Uncertain duration  - No palpitations.  Sxs seem more from the CHF  - Echo is ordered  - Started on IV heparin.  Note also on ASA.  No bleeding.   - Toprol XL 25 mg, rates mostly controlled in the 80-90s  - Replace K  - No known sleep apnea      CHF  - Echo in 2018 with normal LVEF  - GRACIA and edema  - NT pro BNP 10,189  - CXR showing congestive heart failure with pleural effusions and pulmonary vascular congestion  - Echo is ordered  - IV Lasix 40 mg Q12H.  Negative 2L yesterday.  Weight not done today.  Still some rales and LE edema on exam.  Continue to diurese.   - Toprol XL 25 mg, losartan 100 mg   - Low sodium diet  - Daily weights, I/Os      CAD  H/o CABG  Elevated troponin, 0.3  - No chest pain  - Started on IV heparin  - ASA 81 mg, Toprol XL 25 mg, losartan 100 mg, atorvastatin 80 mg      PAD  - PTA on DAPT  - Plavix stopped as we are starting anticoagulation for afib        Nataliia Priest PA-C      Patient Active Problem List   Diagnosis     Atrial fibrillation with RVR (H)     Acute heart failure, unspecified heart failure  type (H)     Acute CHF (congestive heart failure) (H)       Physical Exam   Temp: 96.7  F (35.9  C) Temp src: Oral BP: 112/83 Pulse: 92   Resp: 18 SpO2: 98 % O2 Device: None (Room air)    Vitals:    08/19/20 1248   Weight: 65.2 kg (143 lb 11.8 oz)     Vital Signs with Ranges  Temp:  [96.7  F (35.9  C)-97.6  F (36.4  C)] 96.7  F (35.9  C)  Pulse:  [] 92  Resp:  [18-24] 18  BP: (108-151)/() 112/83  SpO2:  [94 %-98 %] 98 %  I/O last 3 completed shifts:  In: 240 [P.O.:240]  Out: 1975 [Urine:1975]    Constitutional: NAD.   Respiratory: Rales more on left base  Cardiovascular: irr irr, S1S2, mild JVD, 2+ edema  GI: soft, BS+  Skin: warm, no rashes  Musculoskeletal: Moving all extremities  Neurologic: Alert, oriented x 3  Neuropsychiatric: Normal affect       Data   Recent Labs   Lab 08/20/20  0359 08/19/20  1941/20  1538 08/19/20  1110   WBC 4.6  --   --  6.4   HGB 13.0  --   --  13.1   MCV 92  --   --  92     --   --  181     --   --  138   POTASSIUM 3.3*  --   --  4.2   CHLORIDE 101  --   --  105   CO2 28  --   --  22   BUN 18  --   --  17   CR 1.34*  --   --  1.18*   ANIONGAP 10  --   --  11   TORRI 8.6  --   --  9.0   GLC 84  --   --  93   ALBUMIN 3.5  --   --   --    PROTTOTAL 7.0  --   --   --    BILITOTAL 3.3*  --   --   --    ALKPHOS 79  --   --   --    ALT 20  --   --   --    AST 29  --   --   --    TROPI  --  0.305* 0.301* 0.289*     Recent Results (from the past 24 hour(s))   XR Chest Port 1 View    Narrative    CHEST PORTABLE ONE VIEW 8/19/2020 11:08 AM     HISTORY: Shortness of breath.     COMPARISON: None available    FINDINGS: Sternotomy changes. Aortic calcification. Cardiomegaly.      Impression    IMPRESSION: Mild-moderate left pleural effusion. Underlying left base  consolidation/collapse which could simply represent atelectasis. Mild  pulmonary vascular congestion.    FELICIA RICHARDS MD   US Lower Extremity Venous Duplex Bilateral    Narrative    VENOUS ULTRASOUND BILATERAL  LOWER EXTREMITIES  8/19/2020 3:28 PM     HISTORY: Edema, rule out DVT.    COMPARISON: None.    TECHNIQUE: Color Doppler and spectral waveform analysis performed  throughout the deep veins of both lower extremities.    FINDINGS: Both common femoral, proximal greater saphenous, femoral,  and popliteal veins demonstrate normal blood flow, compression, and  augmentation. Posterior tibial and peroneal veins are compressible.      Impression    IMPRESSION: Negative for deep venous thrombosis in both lower  extremities.    NIRANJAN GONZALEZ MD       Medications     Continuing ACE inhibitor/ARB/ARNI from home medication list OR ACE inhibitor/ARB order already placed during this visit       - MEDICATION INSTRUCTIONS -       HEParin 450 Units/hr (08/20/20 0843)     - MEDICATION INSTRUCTIONS -         aspirin  81 mg Oral Daily     atorvastatin  80 mg Oral QPM     furosemide  40 mg Intravenous Q12H     losartan  100 mg Oral QPM     metoprolol succinate ER  25 mg Oral BID     sodium chloride (PF)  3 mL Intracatheter Q8H

## 2020-08-20 NOTE — CONSULTS
Care Transition Initial Assessment - RN        Met with: Patient and daughter, Yeimi.   DATA   Active Problems:    Atrial fibrillation with RVR (H)    Acute heart failure, unspecified heart failure type (H)    Acute CHF (congestive heart failure) (H)       Cognitive Status: awake, alert and oriented.  Primary Care Clinic Name: [facility]  Primary Care MD Name: Dr. Teresita Espino  Contact information and PCP information verified: Yes  Lives With: child(yariel), adult   Living Arrangements: house  Quality of Family Relationships: involved, supportive  Description of Support System: Involved, Supportive   Who is your support system?: Children   Support Assessment: Adequate family and caregiver support   Insurance concerns: No Insurance issues identified  ASSESSMENT  Patient currently receives the following services:  none        Identified issues/concerns regarding health management: patient admitted for increasing BLE edema, SOB, acute CHF with proBNP 10,189 taken 8/19/2020. Met with patient and her daughter, Yeimi. Patient lives at home with her daughter and family. She ambulates with a cane. Patient also has a walker. Patient independent with bathing, grooming and medications.  Pt daughter assist with meals, groceries, transportation.  Discussed and reviewed HF education. HF action plan given to patient and her daughter. Discussed and encouraged low Na diet and daily weights. Pt/family state understanding. Patient has a scale at home and daughter planning on getting a new one.     Discussed therapy recommendation for HH PT/OT at discharge. Patient and daughter agreeable to home care. Pt/family would like [facility]  team to follow when discharge # [phone], ph# [phone], left voice message for intake.  Patient will need orders for HC RN PT OT.    Provided pt/family assistance with scheduling post hospitalization follow up with providers. Patient is followed by [facility]  PCP, Dr. Espino. A post hospitalization follow up appointment was scheduled with Dr. Espino on Wednesday, September 2nd at 1pm.     Pt/daughter requested Cardiology follow up with [facility]. A follow up appointment was scheduled with Dr. Alba Harris at [facility] clinic on Tuesday, September 15th at 1:25pm.         PLAN  Financial costs for the patient include none .  Patient given options and choices for discharge yes .  Patient/family is agreeable to the plan?  Yes: home with HC services  Patient anticipates discharging to home with HC services .        Patient anticipates needs for home equipment: No  Transportation/person available to transport on day of discharge  is daughter .    Care  (CTS) will continue to follow as needed.    Brett Avalos RN BSN PHN CCM   Inpatient Care Coordination   [facility]   [phone]

## 2020-08-20 NOTE — PLAN OF CARE
"OT: Order received, eval completed and treatment initiated. Pt is a 79 year old female admitted with symptoms suggestive of congestive heart failure with increasing ankle edema and dyspnea on exertion.  Patient was found to be in atrial fibrillation with rapid ventricular response. Pt lives with daughter/daughter's family in house, stairs to enter/within, pt's room is in lower level, tub/shower with shower chair. Pt reports indep in all ADLs, med mgmt, light housekeeping and mobility tasks with use of 4ww for long distance walking (ie to the park); w/c for appointments. Family assists with IADLs.     Discharge Planner OT   Patient plan for discharge: Home w/ family support  Current status:  Pt in BR with nursing, direct hand off. Completed sit > stand from toilet with CGA, required min A for pants mgmt during toileting, able to stand at sink for grooming tasks with CGA. Pt ambulated in room with use of IV pole for steadying support and CGA, issued FWW to utilize for safety. Pt fatigued from limited activity, declines further ambulation at this time. Pt issued handouts and educated on CHF symptom monitoring via stoplight tool, EC/WS, low sodium diet and progressive activity. Pt reporting she typically weighs herself, however \"my scale hasn't been working.\"   Barriers to return to prior living situation: Stairs, decreased functional activity tolerance/strength  Recommendations for discharge: Home w/ family assist for bathing, IADLs (homemaking, laundry, meal prep, etc) and HH PT/OT  Rationale for recommendations: Pt is below baseline level of functioning with regards to ADLs/IADLs and mobility tasks, limited by fatigue. Recommend ongoing skilled OT while IP and in HH setting to improve strength, functional activity tolerance and safety needed for daily tasks. HH services indicated as leaving the home environment would require significant effort at this time.        Entered by: Jillian Ordaz 08/20/2020 9:37 AM       "

## 2020-08-20 NOTE — PLAN OF CARE
Heart Failure Care Map  GOALS TO BE MET BEFORE DISCHARGE:    1. Decrease congestion and/or edema with diuretic therapy to achieve near optimal volume status.     Dyspnea improved: No, further care required to meet this goal. Please explain Still has GRACIA   Edema improved: No, further care required to meet this goal. Please explain +2-+3 edema in BLE        Net I/O and Weights since admission:   07/21 0700 - 08/20 0659  In: 240 [P.O.:240]  Out: 1725 [Urine:1725]  Net: -1485     Vitals:    08/19/20 1248   Weight: 65.2 kg (143 lb 11.8 oz)       2.  O2 sats > 92% on room air, or at prior home O2 therapy level.      Able to wean O2 this shift to keep sats above 92%?: N/a, pt does not use O2.    Does patient use Home O2? No          Current oxygenation status:   SpO2: 98 %     O2 Device: None (Room air),      3.  Tolerates ambulation and mobility near baseline.     Ambulation: Yes, satisfactory for discharge.   Times patient ambulated with staff this shift: 5    Please review the Heart Failure Care Map for additional HF goal outcomes.    Eva Hayes, RN  8/20/2020       A&Ox4, VSS on room air, denies chest pain or other discomfort. Up with A1 refusing gait belt, but steady on feet. Potassium replaced for level of 3.3, recheck scheduled for 1045. Continues with 40mg of lasix q12hr. Tele afib CVR with occasional PVCs. Hep10a level 0.47, heparin gtt decreased to 4.5. Plan for echo today. Will continue with POC.

## 2020-08-21 ENCOUNTER — APPOINTMENT (OUTPATIENT)
Dept: OCCUPATIONAL THERAPY | Facility: CLINIC | Age: 80
DRG: 286 | End: 2020-08-21
Attending: INTERNAL MEDICINE
Payer: COMMERCIAL

## 2020-08-21 LAB
ALBUMIN UR-MCNC: NEGATIVE MG/DL
ANION GAP SERPL CALCULATED.3IONS-SCNC: 8 MMOL/L (ref 3–14)
APPEARANCE UR: CLEAR
BILIRUB UR QL STRIP: NEGATIVE
BUN SERPL-MCNC: 21 MG/DL (ref 7–30)
CALCIUM SERPL-MCNC: 8.9 MG/DL (ref 8.5–10.1)
CHLORIDE SERPL-SCNC: 99 MMOL/L (ref 94–109)
CO2 SERPL-SCNC: 29 MMOL/L (ref 20–32)
COLOR UR AUTO: ABNORMAL
CREAT SERPL-MCNC: 1.44 MG/DL (ref 0.52–1.04)
GFR SERPL CREATININE-BSD FRML MDRD: 35 ML/MIN/{1.73_M2}
GLUCOSE SERPL-MCNC: 99 MG/DL (ref 70–99)
GLUCOSE UR STRIP-MCNC: NEGATIVE MG/DL
HGB UR QL STRIP: NEGATIVE
HYALINE CASTS #/AREA URNS LPF: 1 /LPF (ref 0–2)
INTERPRETATION ECG - MUSE: NORMAL
KETONES UR STRIP-MCNC: NEGATIVE MG/DL
LEUKOCYTE ESTERASE UR QL STRIP: NEGATIVE
LMWH PPP CHRO-ACNC: 0.16 IU/ML
MAGNESIUM SERPL-MCNC: 2.6 MG/DL (ref 1.6–2.3)
MUCOUS THREADS #/AREA URNS LPF: PRESENT /LPF
NITRATE UR QL: NEGATIVE
PH UR STRIP: 6.5 PH (ref 5–7)
POTASSIUM SERPL-SCNC: 3.8 MMOL/L (ref 3.4–5.3)
RBC #/AREA URNS AUTO: 0 /HPF (ref 0–2)
SODIUM SERPL-SCNC: 136 MMOL/L (ref 133–144)
SOURCE: ABNORMAL
SP GR UR STRIP: 1.01 (ref 1–1.03)
SQUAMOUS #/AREA URNS AUTO: <1 /HPF (ref 0–1)
UROBILINOGEN UR STRIP-MCNC: NORMAL MG/DL (ref 0–2)
WBC #/AREA URNS AUTO: 0 /HPF (ref 0–5)

## 2020-08-21 PROCEDURE — 81001 URINALYSIS AUTO W/SCOPE: CPT | Performed by: INTERNAL MEDICINE

## 2020-08-21 PROCEDURE — 25000132 ZZH RX MED GY IP 250 OP 250 PS 637: Performed by: INTERNAL MEDICINE

## 2020-08-21 PROCEDURE — 12000000 ZZH R&B MED SURG/OB

## 2020-08-21 PROCEDURE — 25800030 ZZH RX IP 258 OP 636: Performed by: INTERNAL MEDICINE

## 2020-08-21 PROCEDURE — 25000132 ZZH RX MED GY IP 250 OP 250 PS 637: Performed by: PHYSICIAN ASSISTANT

## 2020-08-21 PROCEDURE — 40000275 ZZH STATISTIC RCP TIME EA 10 MIN

## 2020-08-21 PROCEDURE — 85520 HEPARIN ASSAY: CPT | Performed by: INTERNAL MEDICINE

## 2020-08-21 PROCEDURE — 99233 SBSQ HOSP IP/OBS HIGH 50: CPT | Performed by: INTERNAL MEDICINE

## 2020-08-21 PROCEDURE — 25000128 H RX IP 250 OP 636: Performed by: INTERNAL MEDICINE

## 2020-08-21 PROCEDURE — 97140 MANUAL THERAPY 1/> REGIONS: CPT | Mod: GO

## 2020-08-21 PROCEDURE — 93005 ELECTROCARDIOGRAM TRACING: CPT

## 2020-08-21 PROCEDURE — 80048 BASIC METABOLIC PNL TOTAL CA: CPT | Performed by: INTERNAL MEDICINE

## 2020-08-21 PROCEDURE — 36415 COLL VENOUS BLD VENIPUNCTURE: CPT | Performed by: INTERNAL MEDICINE

## 2020-08-21 PROCEDURE — 93010 ELECTROCARDIOGRAM REPORT: CPT | Performed by: INTERNAL MEDICINE

## 2020-08-21 PROCEDURE — 25000128 H RX IP 250 OP 636: Performed by: PHYSICIAN ASSISTANT

## 2020-08-21 PROCEDURE — 99232 SBSQ HOSP IP/OBS MODERATE 35: CPT | Performed by: INTERNAL MEDICINE

## 2020-08-21 PROCEDURE — 83735 ASSAY OF MAGNESIUM: CPT | Performed by: INTERNAL MEDICINE

## 2020-08-21 PROCEDURE — 84132 ASSAY OF SERUM POTASSIUM: CPT | Performed by: INTERNAL MEDICINE

## 2020-08-21 RX ORDER — HYDROMORPHONE HYDROCHLORIDE 1 MG/ML
INJECTION, SOLUTION INTRAMUSCULAR; INTRAVENOUS; SUBCUTANEOUS
Status: DISCONTINUED
Start: 2020-08-21 | End: 2020-08-22 | Stop reason: HOSPADM

## 2020-08-21 RX ORDER — LIDOCAINE HYDROCHLORIDE 10 MG/ML
INJECTION, SOLUTION EPIDURAL; INFILTRATION; INTRACAUDAL; PERINEURAL
Status: DISCONTINUED
Start: 2020-08-21 | End: 2020-08-25 | Stop reason: HOSPADM

## 2020-08-21 RX ORDER — HYDROMORPHONE HYDROCHLORIDE 1 MG/ML
0.2 INJECTION, SOLUTION INTRAMUSCULAR; INTRAVENOUS; SUBCUTANEOUS EVERY 4 HOURS PRN
Status: DISCONTINUED | OUTPATIENT
Start: 2020-08-21 | End: 2020-08-26

## 2020-08-21 RX ORDER — POTASSIUM CHLORIDE 1500 MG/1
20 TABLET, EXTENDED RELEASE ORAL
Status: DISCONTINUED | OUTPATIENT
Start: 2020-08-21 | End: 2020-08-25 | Stop reason: HOSPADM

## 2020-08-21 RX ORDER — LOSARTAN POTASSIUM 25 MG/1
50 TABLET ORAL EVERY EVENING
Status: DISCONTINUED | OUTPATIENT
Start: 2020-08-21 | End: 2020-08-21

## 2020-08-21 RX ORDER — SODIUM CHLORIDE 9 MG/ML
INJECTION, SOLUTION INTRAVENOUS CONTINUOUS
Status: DISCONTINUED | OUTPATIENT
Start: 2020-08-21 | End: 2020-08-23

## 2020-08-21 RX ORDER — HEPARIN SODIUM 1000 [USP'U]/ML
INJECTION, SOLUTION INTRAVENOUS; SUBCUTANEOUS
Status: DISCONTINUED
Start: 2020-08-21 | End: 2020-08-25 | Stop reason: HOSPADM

## 2020-08-21 RX ORDER — LORAZEPAM 2 MG/ML
0.5 INJECTION INTRAMUSCULAR
Status: DISCONTINUED | OUTPATIENT
Start: 2020-08-21 | End: 2020-08-25

## 2020-08-21 RX ORDER — LORAZEPAM 0.5 MG/1
0.5 TABLET ORAL
Status: DISCONTINUED | OUTPATIENT
Start: 2020-08-21 | End: 2020-08-25

## 2020-08-21 RX ORDER — SODIUM CHLORIDE 9 MG/ML
INJECTION, SOLUTION INTRAVENOUS CONTINUOUS
Status: DISCONTINUED | OUTPATIENT
Start: 2020-08-21 | End: 2020-08-21

## 2020-08-21 RX ORDER — LIDOCAINE 40 MG/G
CREAM TOPICAL
Status: DISCONTINUED | OUTPATIENT
Start: 2020-08-21 | End: 2020-08-25 | Stop reason: HOSPADM

## 2020-08-21 RX ORDER — NITROGLYCERIN 5 MG/ML
VIAL (ML) INTRAVENOUS
Status: DISCONTINUED
Start: 2020-08-21 | End: 2020-08-25 | Stop reason: HOSPADM

## 2020-08-21 RX ADMIN — ACETAMINOPHEN 650 MG: 325 TABLET, FILM COATED ORAL at 16:25

## 2020-08-21 RX ADMIN — METOPROLOL SUCCINATE 25 MG: 25 TABLET, EXTENDED RELEASE ORAL at 09:23

## 2020-08-21 RX ADMIN — HEPARIN SODIUM 450 UNITS/HR: 10000 INJECTION, SOLUTION INTRAVENOUS at 06:40

## 2020-08-21 RX ADMIN — HYDROMORPHONE HYDROCHLORIDE 0.2 MG: 1 INJECTION, SOLUTION INTRAMUSCULAR; INTRAVENOUS; SUBCUTANEOUS at 12:18

## 2020-08-21 RX ADMIN — MAGNESIUM SULFATE IN WATER 2 G: 40 INJECTION, SOLUTION INTRAVENOUS at 00:43

## 2020-08-21 RX ADMIN — ONDANSETRON 4 MG: 4 TABLET, ORALLY DISINTEGRATING ORAL at 18:21

## 2020-08-21 RX ADMIN — METOPROLOL SUCCINATE 25 MG: 25 TABLET, EXTENDED RELEASE ORAL at 21:01

## 2020-08-21 RX ADMIN — SODIUM CHLORIDE: 9 INJECTION, SOLUTION INTRAVENOUS at 13:24

## 2020-08-21 RX ADMIN — ATORVASTATIN CALCIUM 80 MG: 40 TABLET, FILM COATED ORAL at 21:01

## 2020-08-21 RX ADMIN — ACETAMINOPHEN 650 MG: 325 TABLET, FILM COATED ORAL at 09:19

## 2020-08-21 RX ADMIN — SODIUM CHLORIDE: 9 INJECTION, SOLUTION INTRAVENOUS at 09:58

## 2020-08-21 RX ADMIN — ASPIRIN 325 MG: 325 TABLET, COATED ORAL at 09:20

## 2020-08-21 RX ADMIN — FUROSEMIDE 40 MG: 10 INJECTION, SOLUTION INTRAMUSCULAR; INTRAVENOUS at 09:21

## 2020-08-21 ASSESSMENT — ACTIVITIES OF DAILY LIVING (ADL)
ADLS_ACUITY_SCORE: 12
ADLS_ACUITY_SCORE: 12
ADLS_ACUITY_SCORE: 14
ADLS_ACUITY_SCORE: 12
ADLS_ACUITY_SCORE: 14
ADLS_ACUITY_SCORE: 12

## 2020-08-21 ASSESSMENT — MIFFLIN-ST. JEOR: SCORE: 1011.13

## 2020-08-21 NOTE — PLAN OF CARE
Vss ex /64. IV lasix. Hep gtt maintained. Echo showing 25-30%. Cards following. Order placed for cardiac cath. Sign and held orders. Pt denies any CP. Pt reports SOB. BLE. Lymphedema consult.  Mag 1.8. Medication ordered up. Night shift RN aware. Discharge TBD. Pt from home with daughter. Will continue to monitor.

## 2020-08-21 NOTE — PROGRESS NOTES
[facility]    Hospitalist Progress Note      Assessment & Plan   Noelle Mullins is a 79 year old female who was admitted on 8/19/2020.    Summary of Stay:   Noelle Mullins is a very pleasant 79 y.o. [place] woman who speaks good English and has PMH including NSTEMI (stenting of a high-grade stenosis in the proximal right coronary artery in 2017), CABG (2010), SVT, peripheral arterial disease with bilateral iliac stents.    She presented 8/19/20 presents w/ 3+ LE edema and dyspnea found to have a-fib w/ RVR, bnp elevated at 10,189 and troponin elevation of 0.289 w/ CXR showing mild to moderate left pleural effusion and mild pulmonary vascular congestion.      She was started on IV diuresis with Lasix.  Heart rate was controlled with oral metoprolol.  Started on IV heparin infusion.  Cardiology consulted.    The rate is adequately controlled.  Being diuresed.  Still has lower extremity edema but may also have lymphedema component.    The plan was to do a cardiac cath today but the patient was not able to lie flat due to a left flank pain.  So the cath was not attempted.    The left flank pain has started this morning.  No pain before that.  Denies any dysuria.  Denies any history of kidney stones.    Plan:    Heart failure with reduced ejection fraction, with acute decompensation.  - Ejection fraction 25 to 30% which is new.  Severe global hypokinesis.  Moderately decreased RV systolic function.  - Likely related to tachycardia with A. fib.  But given her coronary artery disease history, ischemic cause needed to be ruled out.  - She is being diuresed with IV Lasix.  Creatinine has trended up.  - Plan on cardiac cath per cardiology team.  But will likely happen on Monday given her left flank pain and rise in the creatinine.  -Continue metoprolol.  - Hold the Lasix for today. Received IV hydration for possible cath.  - Recheck labs in the morning.  Further diuresis based on repeat labs.  -  IV heparin, low-dose aspirin.    Atrial fibrillation.  -The rate is controlled with oral metoprolol.  -On IV heparin infusion.    Acute kidney injury  - The creatinine has gone up from 1.18 at admission to 1.44.  - Nonoliguric.  - Hold the losartan.  Hold Lasix for now.  Repeat in the morning.    Left flank pain.  -Uncertain etiology.  -She does have tenderness on the left flank.  No radiation of the pain.  No anterior radiation.  - No hematuria grossly, no other urinary symptoms.  -Check a UA.  If microscopic hematuria on the UA then may need imaging to rule out stones.  -Other etiology could be musculoskeletal pain.  No midline tenderness.  No pain on the right side.  May use ice pack or hot pack as necessary.    Bilateral lower extremity edema  -Lymphedema wraps in place.    Peripheral arterial disease.  -Continue aspirin.  Plavix is being held.  On IV heparin.     Coronary artery disease  - Overall stable.  Medication management as above.      DVT Prophylaxis: IV heparin  Code Status: Full Code  Expected discharge: Monday    Luis Goldman MD  Text Page (7am - 6pm, M-F)    Interval History   Patient was evaluated with nursing staff. Overnight issues discussed.    Review of systems:  No nausea or vomiting.  No abdominal pain.  No diarrhea.  No chest pain/palpitations.  No new cough/shortness of breath.  No headache/visual disturbance/new weakness.    -Data reviewed today: Labs and medications.    Physical Exam   Temp: 97  F (36.1  C) Temp src: Oral BP: 116/66 Pulse: 86   Resp: 16 SpO2: 93 % O2 Device: None (Room air)    Vitals:    08/19/20 1248 08/21/20 0648   Weight: 65.2 kg (143 lb 11.8 oz) 61 kg (134 lb 6.4 oz)     Vital Signs with Ranges  Temp:  [96.7  F (35.9  C)-97  F (36.1  C)] 97  F (36.1  C)  Pulse:  [80-92] 86  Resp:  [16-18] 16  BP: (101-116)/(64-76) 116/66  SpO2:  [93 %-98 %] 93 %  I/O last 3 completed shifts:  In: 480 [P.O.:480]  Out: -     Constitutional: Awake, alert, cooperative, no apparent  distress  HEENT: Trachea midline, sclera is clear   Respiratory: No crackles. No wheezing. Equal breath sounds bilaterally.  Cardiovascular:  normal S1 and S2, and no murmur noted  GI: Normal bowel sounds, soft, non-distended, left flank tenderness, posteriorly.  Skin/Integumen: No rashes, no cyanosis  Extremities: Bilateral edema, lymphedema wraps in place.    Medications     Continuing ACE inhibitor/ARB/ARNI from home medication list OR ACE inhibitor/ARB order already placed during this visit       - MEDICATION INSTRUCTIONS -       HEParin 450 Units/hr (08/21/20 1208)     - MEDICATION INSTRUCTIONS -       - MEDICATION INSTRUCTIONS -       sodium chloride 50 mL/hr at 08/21/20 0958     sodium chloride 50 mL/hr at 08/21/20 1324       aspirin  325 mg Oral Daily     aspirin  81 mg Oral Daily     atorvastatin  80 mg Oral QPM     heparin (porcine)         HYDROmorphone (PF)         lidocaine (PF)         metoprolol succinate ER  25 mg Oral BID     nitroGLYcerin in D5W         sodium chloride (PF)  3 mL Intracatheter Q8H     sodium chloride (PF)  3 mL Intracatheter Q8H       Data   Recent Labs   Lab 08/21/20  0633 08/20/20  1044 08/20/20  0359 08/19/20  1941/20  1538 08/19/20  1110   WBC  --   --  4.6  --   --  6.4   HGB  --   --  13.0  --   --  13.1   MCV  --   --  92  --   --  92   PLT  --   --  182  --   --  181     --  139  --   --  138   POTASSIUM 3.8 4.3 3.3*  --   --  4.2   CHLORIDE 99  --  101  --   --  105   CO2 29  --  28  --   --  22   BUN 21  --  18  --   --  17   CR 1.44*  --  1.34*  --   --  1.18*   ANIONGAP 8  --  10  --   --  11   TORRI 8.9  --  8.6  --   --  9.0   GLC 99  --  84  --   --  93   ALBUMIN  --   --  3.5  --   --   --    PROTTOTAL  --   --  7.0  --   --   --    BILITOTAL  --   --  3.3*  --   --   --    ALKPHOS  --   --  79  --   --   --    ALT  --   --  20  --   --   --    AST  --   --  29  --   --   --    TROPI  --   --   --  0.305* 0.301* 0.289*       No results found for this or  any previous visit (from the past 24 hour(s)).

## 2020-08-21 NOTE — PROGRESS NOTES
08/21/20 0830   General Information   Discipline OT   Onset of Edema 08/19/20   Affected Body Part(s) Left LE;Right LE   Edema Etiology Unknown   Pertinent history of current problem (PT: include personal factors and/or comorbidities that impact the POC; OT: include additional occupational profile info) Per chart: Pt is a 79 year old female admitted with symptoms suggestive of congestive heart failure with increasing ankle edema and dyspnea on exertion. Pt now consulted for lymphedema consult.    Edema Precautions Cardiac Edema/CHF   Pain   Patient currently in pain No   Edema Examination / Assessment   Skin Condition Pitting   Stemmer Sign Positive   Assessment/Plan   Patient presents with Stage 2 Lymphedema   Assessment Pt presenting with 2+ pitting edema in distal LEs including dorsum of feet, ankles and lower LEs   Assessment of Occupational Performance 1-3 Performance Deficits   Identified Performance Deficits Ambualtion   Clinical Decision Making (Complexity) Low complexity   Planned Edema Interventions Gradient compression bandaging;Exercises;Manual therapy   Treatment Frequency Daily   Treatment Duration 3 days   Patient, Family and/or Staff in agreement with plan of care. Yes   Risks and benefits of treatment have been explained. Yes   Total Evaluation Time   Total Evaluation Time (Minutes) 5

## 2020-08-21 NOTE — PROGRESS NOTES
Discharge Planner   Discharge Plans in progress: CM following for coordination of home PT/OT at discharge. Family elected [facility], called [phone] to follow-up on this, LM for intake to call back.   Barriers to discharge plan: Medical clearance.   Follow up plan: CM will continue to follow and coordinate discharge plan.        Entered by: Amanda Brandt 08/21/2020 10:40 AM       Update 1120: Received call back from [facility], they report that pt resides outside of their service area so they will not be able to open her to services. Will discuss further with pt.     Update 1400: Met w/ pt at bedside, discussed that we need additional HC choice. Pt/family was offered but declined the Medicare Compare list for Home Care.  Discussed associated Medicare star ratings to assist with choice for referrals/discharge planning Yes    Education was given to pt/family that star ratings are updated/maintained by Medicare and can be reviewed by visiting www.medicare.gov Yes    Pt has no preference on agency and is agreeable to having referral sent to [facility]. E-mail referral sent for Home RN/PT/OT/Lymphedema. AVS updated. If pt does not have coverage with [facility], will need to readdress.       CM will continue to follow patient for any additional discharge needs.     Amanda Brandt RN BSN   Inpatient Care Coordination  [facility]   Phone [phone]

## 2020-08-21 NOTE — PLAN OF CARE
Pt A/O x 4, VSS, pt denies headache, dizziness, & N/V. Pt reports left flank/back pain, dyspneic upon exertion. Administered PO Tylenol. Pt up SBA w/gait belt & walker. Tele: Afib, CVR, w/occ PVCs; EF 25-30%. Lung sounds diminished/crackles, RA; productive cough. Hep gtt @ 4.5 mL/hr. IV Lasix. BLE edema +2/+3; nwe Lymphedema wraps placed, can be removed at bedtime. Cards & CORE consulted. Will continue with plan of care.    Angio planned for today, but was rescheduled for Monday due to flank/back pain. MD notified.

## 2020-08-21 NOTE — PROVIDER NOTIFICATION
pt due for cozaar 100mg, Metoprolol 25mg and 40mg Lasix IV. /64. Would you like all of these to be given?

## 2020-08-21 NOTE — PROGRESS NOTES
Pt down in pre/post and began having left flank and lower abd pain. Pt in tears, could not lay flat. Ok when sitting up. MD requests that pt be brought back to her room until pain can be figured out. Primary RN notified, MD paged.

## 2020-08-21 NOTE — CONSULTS
NUTRITION EDUCATION    REASON FOR NUTRITION CONSULT:  Provider Order  -  Nutrition Education on a 2 gram sodium diet (CHF order set)     ASSESSMENT:  Defer complete nutrition assessment and instructions - coronary angio planned for today. Of note, OT + care coordinator reviewed low Na diet and CORE clinical referral placed.    FOLLOW UP:   Will instruct patient prior to discharge as patient availability and schedule allows     Eleni Reynoso MS, RDN, LD, CNSC  Pager - 3rd floor/ICU: [phone]  Pager - All other floors: [phone]  Pager - Weekend/holiday: [phone]  Office: [phone]

## 2020-08-21 NOTE — PLAN OF CARE
A&Ox4, VSS on room air, denies pain. Up with SBA. Heparin gtt running at 4.5/hr. NPO for cath today. Continues on troprol xl BID and IV lasix q12hrs. Mag replaced, recheck scheduled for this AM. Tele afib CVR with PVCs. Will continue with POC.

## 2020-08-21 NOTE — PROGRESS NOTES
[facility]  Cardiology Progress Note    Outpatient cardiologist: [facility]     Date of Service (when I saw the patient): 08/21/2020    Summary: Noelle Mullins is a 79 year old female from the [facility] with history of coronary artery bypass grafting in 2010 with a LIMA to the LAD and a radial graft to the right coronary artery and a saphenous vein graft to the obtuse marginal artery.  All the grafts were open in 2017.  She had stenting to her right coronary artery in 2017.  In 2018 her ejection fraction was normal. Also PAD with bilateral iliac stenting, HTN, who was admitted on 8/19/2020 with GRACIA and edema x 2 weeks, consistent with CHF.  Found to be in afib with RVR.       Interval History   Tele: afib, rate 80-90s, occ PVC    SOB improving.   Legs wrapped.        Assessment & Plan   New afib with RVR  - Uncertain duration  - No palpitations.  Sxs seem more from the CHF  - Echo 8/20/20: LVEF 25-30%, severe global hypokinesis, mild MR, mild to mod TR   - Started on IV heparin.  Also on ASA, stopped PTA Plavix.  No bleeding.   - Increased Toprol XL to 25 mg BID, rates mostly controlled in the 80-90s  - Replace K  - No known sleep apnea      Acute systolic CHF  Cardiomyopathy, suspect rate related but need to rule out CAD  - Echo in 2018 with normal LVEF  - GRACIA and edema  - NT pro BNP 10,189  - CXR showing congestive heart failure with pleural effusions and pulmonary vascular congestion  - Echo 8/20/20: LVEF 25-30%, severe global hypokinesis, mild MR, mild to mod TR   - Cardiac cath possible intervention today to evaluate the new cardiomyopathy   * Risks and benefits of coronary angiogram discussed today including, bleeding, bruising, infection, allergic reaction, kidney damage (including need for dialysis), stroke, heart attack, vascular damage, emergency open heart surgery, up to and including death.  Patient indicates understanding and is agreeable to proceed.     * No apparent CI  to GEETA.  PTA she was on DAPT for peripheral stenting.  Note she will now be on anticoagulation for afib   * NPO   * Will hold IVF given she continues to have rales on exam  - IV Lasix 40 mg Q12H.  Net negative 2L.  Weight is down 9 lbs (143->134 lbs).  Breathing much improved.  Still some rales and LE edema on exam. Likely transition to PO tomorrow.   - Toprol XL 25 mg BID, losartan 50 mg   - Low sodium diet  - Daily weights, I/Os      CAD  H/o CABG  Elevated troponin, 0.3  - No chest pain  - Started on IV heparin for afib  - ASA 81 mg, Toprol XL 25 mg BID, losartan 50 mg, atorvastatin 80 mg  - As noted above, cardiac cath possible intervention today to evaluate the new cardiomyopathy      PAD  - PTA on DAPT  - Plavix stopped as we are starting anticoagulation for afib      HTN  - Home meds hydrochlorothiazide 12.5 mg, losartan 100 mg, Toprol XL 25 mg  - Toprol XL increased to 25 mg BID for afib and rate control  - Losartan decreased to 50 mg  - currently using IV Lasix and will likely switch to PO Lasix instead of hydrochlorothiazide         Nataliia Priest PA-C      Patient Active Problem List   Diagnosis     Atrial fibrillation with RVR (H)     Acute heart failure, unspecified heart failure type (H)     Acute CHF (congestive heart failure) (H)       Physical Exam   Temp: 97  F (36.1  C) Temp src: Oral BP: 116/66 Pulse: 86   Resp: 16 SpO2: 93 % O2 Device: None (Room air)    Vitals:    08/19/20 1248 08/21/20 0648   Weight: 65.2 kg (143 lb 11.8 oz) 61 kg (134 lb 6.4 oz)     Vital Signs with Ranges  Temp:  [96.7  F (35.9  C)-97  F (36.1  C)] 97  F (36.1  C)  Pulse:  [80-92] 86  Resp:  [16-18] 16  BP: (101-116)/(64-76) 116/66  SpO2:  [93 %-98 %] 93 %  I/O last 3 completed shifts:  In: 480 [P.O.:480]  Out: -     Constitutional: NAD.   Respiratory: Bibasilar rales L>R  Cardiovascular: irr irr, S1S2, legs wrapped  GI: soft, BS+  Skin: warm, no rashes  Musculoskeletal: Moving all extremities  Neurologic: Alert, oriented  x 3  Neuropsychiatric: Normal affect       Data   Recent Labs   Lab 20  0633 20  1044 20  0359 20  1941 20  1538 20  1110   WBC  --   --  4.6  --   --  6.4   HGB  --   --  13.0  --   --  13.1   MCV  --   --  92  --   --  92   PLT  --   --  182  --   --  181     --  139  --   --  138   POTASSIUM 3.8 4.3 3.3*  --   --  4.2   CHLORIDE 99  --  101  --   --  105   CO2 29  --  28  --   --  22   BUN 21  --  18  --   --  17   CR 1.44*  --  1.34*  --   --  1.18*   ANIONGAP 8  --  10  --   --  11   TORRI 8.9  --  8.6  --   --  9.0   GLC 99  --  84  --   --  93   ALBUMIN  --   --  3.5  --   --   --    PROTTOTAL  --   --  7.0  --   --   --    BILITOTAL  --   --  3.3*  --   --   --    ALKPHOS  --   --  79  --   --   --    ALT  --   --  20  --   --   --    AST  --   --  29  --   --   --    TROPI  --   --   --  0.305* 0.301* 0.289*     Recent Results (from the past 24 hour(s))   Echocardiogram Complete    Narrative    [id number]  [id number]  [id number]  [id number]^LYNN^KALA^[facility]  Echocardiography Laboratory  [address] [place] [address]        Name: ALYX OLIVER  MRN: [id number]  : 1940  Study Date: 2020 11:20 AM  Age: 79 yrs  Gender: Female  Patient Location: [facility]  Reason For Study: Heart Failure  Ordering Physician: KALA BAILEY  Referring Physician: kim Espino  Performed By: Indra Price RDCS     BSA: 1.6 m2  Height: 60 in  Weight: 143 lb  HR: 94  BP: 121/105 mmHg  _____________________________________________________________________________  __        Procedure  Complete Portable Echo Adult. Optison (NDC #[id number]) given intravenously.  _____________________________________________________________________________  __        Interpretation Summary     The left ventricle is normal in size. There is normal left ventricular wall  thickness. Left ventricular systolic function is severely reduced. The  visual  ejection fraction is estimated at 25-30%. Diastolic function not assessed due  to atrial fibrillation. There is severe global hypokinesis with minor regional  variation. There is no thrombus seen in the left ventricle.  The right ventricle is normal size. Moderately decreased right ventricular  systolic function.  The left atrium is moderately dilated.  Mild to moderate tricuspid regurgitation.  No pericardial effusion.  No previous study for comparison.  _____________________________________________________________________________  __        Left Ventricle  The left ventricle is normal in size. There is normal left ventricular wall  thickness. Left ventricular systolic function is severely reduced. The visual  ejection fraction is estimated at 25-30%. Diastolic function not assessed due  to atrial fibrillation. There is severe global hypokinesis with minor regional  variation. There is no thrombus seen in the left ventricle.     Right Ventricle  The right ventricle is normal size. Moderately decreased right ventricular  systolic function.     Atria  The left atrium is moderately dilated. Right atrial size is normal. There is  no color Doppler evidence of an atrial shunt.     Mitral Valve  The mitral valve leaflets are mildly thickened. There is mild (1+) mitral  regurgitation.        Tricuspid Valve  There is mild to moderate (1-2+) tricuspid regurgitation. The right  ventricular systolic pressure is approximated at 23.5 mmHg plus the right  atrial pressure.     Aortic Valve  There is mild trileaflet aortic sclerosis. No aortic regurgitation is present.  No aortic stenosis is present.     Pulmonic Valve  There is no pulmonic valvular stenosis.     Vessels  The aortic root is normal size. Normal size ascending aorta. The inferior vena  cava is normal.     Pericardium  There is no pericardial effusion. Small left pleural effusion.        Rhythm  The rhythm was atrial  fibrillation.  _____________________________________________________________________________  __  MMode/2D Measurements & Calculations  IVSd: 0.92 cm     LVIDd: 5.3 cm  LVIDs: 4.5 cm  LVPWd: 0.98 cm  FS: 14.8 %  LV mass(C)d: 188.1 grams  LV mass(C)dI: 116.2 grams/m2  Ao root diam: 2.7 cm  LA dimension: 4.3 cm  asc Aorta Diam: 3.0 cm  LA/Ao: 1.6  LVOT diam: 1.8 cm  LVOT area: 2.4 cm2  LA Volume (BP): 89.0 ml  RWT: 0.37        Doppler Measurements & Calculations  MV E max refugio: 125.4 cm/sec  MV dec time: 0.18 sec  TR max refugio: 239.4 cm/sec  TR max P.5 mmHg  E/E' av.6  Lateral E/e': 14.8  Medial E/e': 18.4              _____________________________________________________________________________  __           Report approved by: Chrissie Singer 2020 12:53 PM          Medications     Continuing ACE inhibitor/ARB/ARNI from home medication list OR ACE inhibitor/ARB order already placed during this visit       - MEDICATION INSTRUCTIONS -       HEParin 450 Units/hr (20 0755)     - MEDICATION INSTRUCTIONS -       - MEDICATION INSTRUCTIONS -       sodium chloride 50 mL/hr at 20 0969       heparin (porcine)         lidocaine (PF)         nitroGLYcerin in D5W         aspirin  325 mg Oral Daily     aspirin  81 mg Oral Daily     atorvastatin  80 mg Oral QPM     furosemide  40 mg Intravenous Q12H     losartan  50 mg Oral QPM     metoprolol succinate ER  25 mg Oral BID     sodium chloride (PF)  3 mL Intracatheter Q8H     sodium chloride (PF)  3 mL Intracatheter Q8H

## 2020-08-21 NOTE — PLAN OF CARE
OT/lymph: Pt is a 79 year old female admitted with symptoms suggestive of congestive heart failure with increasing ankle edema and dyspnea on exertion. Pt now consulted for lymphedema.    Discharge Planner OT   Patient plan for discharge: Home w/ family support  Current status:  Per discussion with RN, pt with planned angio this date, ok'd for lymph wrapping. Lymphedema evaluation complete and treatment initiated.  Pt is appropriate for LE quick wrap, using short stretch (SS) bandages, not ACE wraps. LEs washed, lotion applied, and quick wraps completed to  BLEs base of toes to just below knees with appropriate stretch and spacing to allow gradient compression.  Wraps applied bilaterally. Coordinated with nursing regarding wearing schedule, and completing LE cares for the following day prior to therapy arrival. Updated white board with schedule. If pt does not tolerate wraps well, please remove wraps but keep LEs elevated. Pt educated on negative signs/symptoms of LE wraps and edema exercises. Pt declined mobility tasks this AM.     Nursing: please remove BLE lymph wraps @ 0800 on 8/22, OT to rewrap on 8/22.     Barriers to return to prior living situation: Stairs, decreased functional activity tolerance/strength  Recommendations for discharge: Home w/ family assist for bathing, IADLs (homemaking, laundry, meal prep, etc) and HH PT/OT, home lymphedema  Rationale for recommendations: Pt is below baseline level of functioning with regards to ADLs/IADLs and mobility tasks, limited by fatigue. Recommend ongoing skilled OT while IP and in HH setting to improve strength, functional activity tolerance and safety needed for daily tasks. HH services indicated as leaving the home environment would require significant effort at this time.        Entered by: Jillian Ordaz 08/21/2020 8:56 AM

## 2020-08-22 ENCOUNTER — APPOINTMENT (OUTPATIENT)
Dept: OCCUPATIONAL THERAPY | Facility: CLINIC | Age: 80
DRG: 286 | End: 2020-08-22
Payer: COMMERCIAL

## 2020-08-22 LAB
ANION GAP SERPL CALCULATED.3IONS-SCNC: 5 MMOL/L (ref 3–14)
BUN SERPL-MCNC: 19 MG/DL (ref 7–30)
CALCIUM SERPL-MCNC: 8.5 MG/DL (ref 8.5–10.1)
CHLORIDE SERPL-SCNC: 100 MMOL/L (ref 94–109)
CO2 SERPL-SCNC: 31 MMOL/L (ref 20–32)
CREAT SERPL-MCNC: 1.25 MG/DL (ref 0.52–1.04)
ERYTHROCYTE [DISTWIDTH] IN BLOOD BY AUTOMATED COUNT: 15 % (ref 10–15)
GFR SERPL CREATININE-BSD FRML MDRD: 41 ML/MIN/{1.73_M2}
GLUCOSE SERPL-MCNC: 78 MG/DL (ref 70–99)
HCT VFR BLD AUTO: 43 % (ref 35–47)
HGB BLD-MCNC: 13.2 G/DL (ref 11.7–15.7)
LMWH PPP CHRO-ACNC: 0.35 IU/ML
MCH RBC QN AUTO: 28.5 PG (ref 26.5–33)
MCHC RBC AUTO-ENTMCNC: 30.7 G/DL (ref 31.5–36.5)
MCV RBC AUTO: 93 FL (ref 78–100)
PLATELET # BLD AUTO: 190 10E9/L (ref 150–450)
POTASSIUM SERPL-SCNC: 3.9 MMOL/L (ref 3.4–5.3)
RBC # BLD AUTO: 4.63 10E12/L (ref 3.8–5.2)
SODIUM SERPL-SCNC: 136 MMOL/L (ref 133–144)
WBC # BLD AUTO: 5.3 10E9/L (ref 4–11)

## 2020-08-22 PROCEDURE — 36415 COLL VENOUS BLD VENIPUNCTURE: CPT | Performed by: INTERNAL MEDICINE

## 2020-08-22 PROCEDURE — 25000132 ZZH RX MED GY IP 250 OP 250 PS 637: Performed by: INTERNAL MEDICINE

## 2020-08-22 PROCEDURE — 12000000 ZZH R&B MED SURG/OB

## 2020-08-22 PROCEDURE — 97140 MANUAL THERAPY 1/> REGIONS: CPT | Mod: GO

## 2020-08-22 PROCEDURE — 85520 HEPARIN ASSAY: CPT | Performed by: INTERNAL MEDICINE

## 2020-08-22 PROCEDURE — 97535 SELF CARE MNGMENT TRAINING: CPT | Mod: GO

## 2020-08-22 PROCEDURE — 99233 SBSQ HOSP IP/OBS HIGH 50: CPT | Performed by: INTERNAL MEDICINE

## 2020-08-22 PROCEDURE — 85027 COMPLETE CBC AUTOMATED: CPT | Performed by: INTERNAL MEDICINE

## 2020-08-22 PROCEDURE — 80048 BASIC METABOLIC PNL TOTAL CA: CPT | Performed by: INTERNAL MEDICINE

## 2020-08-22 PROCEDURE — 99232 SBSQ HOSP IP/OBS MODERATE 35: CPT | Performed by: INTERNAL MEDICINE

## 2020-08-22 PROCEDURE — 25000128 H RX IP 250 OP 636: Performed by: PHYSICIAN ASSISTANT

## 2020-08-22 PROCEDURE — 99207 ZZC CDG-MDM COMPONENT: MEETS MODERATE - UP CODED: CPT | Performed by: INTERNAL MEDICINE

## 2020-08-22 RX ORDER — ACETAMINOPHEN 325 MG/1
975 TABLET ORAL 3 TIMES DAILY
Status: DISCONTINUED | OUTPATIENT
Start: 2020-08-22 | End: 2020-08-27 | Stop reason: HOSPADM

## 2020-08-22 RX ADMIN — METOPROLOL SUCCINATE 25 MG: 25 TABLET, EXTENDED RELEASE ORAL at 08:42

## 2020-08-22 RX ADMIN — ONDANSETRON 4 MG: 4 TABLET, ORALLY DISINTEGRATING ORAL at 13:59

## 2020-08-22 RX ADMIN — ASPIRIN 81 MG: 81 TABLET, COATED ORAL at 08:42

## 2020-08-22 RX ADMIN — POTASSIUM CHLORIDE 20 MEQ: 1.5 POWDER, FOR SOLUTION ORAL at 18:09

## 2020-08-22 RX ADMIN — ACETAMINOPHEN 975 MG: 325 TABLET, FILM COATED ORAL at 22:10

## 2020-08-22 RX ADMIN — ACETAMINOPHEN 975 MG: 325 TABLET, FILM COATED ORAL at 16:02

## 2020-08-22 RX ADMIN — METOPROLOL SUCCINATE 25 MG: 25 TABLET, EXTENDED RELEASE ORAL at 20:34

## 2020-08-22 RX ADMIN — CYCLOBENZAPRINE HYDROCHLORIDE 10 MG: 5 TABLET, FILM COATED ORAL at 13:59

## 2020-08-22 RX ADMIN — ACETAMINOPHEN 975 MG: 325 TABLET, FILM COATED ORAL at 09:32

## 2020-08-22 RX ADMIN — ATORVASTATIN CALCIUM 80 MG: 40 TABLET, FILM COATED ORAL at 20:34

## 2020-08-22 ASSESSMENT — ACTIVITIES OF DAILY LIVING (ADL)
ADLS_ACUITY_SCORE: 12
ADLS_ACUITY_SCORE: 13
ADLS_ACUITY_SCORE: 12
ADLS_ACUITY_SCORE: 13

## 2020-08-22 ASSESSMENT — MIFFLIN-ST. JEOR: SCORE: 1000.7

## 2020-08-22 NOTE — PROGRESS NOTES
[place] Progress Note     Aníbal Schaffer MD  08/22/2020         Interval History:      Patient denies any flank pain, creatinine improving, no chest discomfort, shortness of breath or orthopnea.  Hemodynamically stable       Assessment and Plan:      79-year-old female with CAD, CABG 2010 (LIMA to LAD, radial artery to RCA, SVG to OM, patent grafts on coronary angiogram 2017 but she underwent proximal RCA native vessel PCI for non-ST was microinfarction), admitted with acute decompensated congestive heart failure with severe reduced LV systolic function with LVEF 25 to 30% with severe global hypokinesia, newly diagnosed atrial fibrillation with rapid ventricular rate.  She was managed on rate control strategy and started on heparin drip.  She was set up for coronary angiogram but this had to be canceled yesterday because of acute flank pain and worsening of kidney functions.  She no longer has flank pain and creatinine is improving.  Lasix and losartan have been held.  She is on heparin drip, aspirin, Lipitor, metoprolol succinate 25 mg twice daily.  Ventricular rates appear reasonably controlled.  Today on examination she appears compensated near to euvolemic.  For now is reasonable to withhold both Lasix and losartan until kidney function is improving preferably till coronary angiogram is done.  Anticipating coronary angiogram Monday.    1.  Newly diagnosed acute systolic congestive heart failure with severe reduced LV systolic function.  Etiology could be tachycardia mediated cardiomyopathy due to recently diagnosed atrial fibrillation with rapid ventricular rate.  Ischemic cardiomyopathy cannot entirely rule out.  2.  Newly diagnosed atrial fibrillation with rapid ventricular rate.  On rate control strategy.  Ventricular rates appear well-controlled on current dose of beta-blocker.  Chads 2 Vascor of 5, on heparin drip.  This can be changed to oral anticoagulation post coronary angiogram.  3.  Known CAD with  the CABG in 2010, coronary angiogram in 2017 showed patent LIMA to LAD, radial to RCA, SVG to OM graft.  She underwent native proximal RCA PCI at that time.  LVEF 55% echo in 2017.  4.  Peripheral vascular disease with reported history of right common iliac artery stent.  5.  Acute flank pain.  As per internal medicine team.  Patient tells me that she is no longer feeling any pain.    Recommendations  Continue heparin drip, aspirin, statin, beta-blocker  Reasonable to continue hold Lasix, losartan as recently kidney functions worsen and outs improving  Coronary angiogram on Monday.           Physical Exam:       , Blood pressure 123/56, pulse 85, temperature 98.2  F (36.8  C), temperature source Oral, resp. rate 16, height 1.524 m (5'), weight 59.9 kg (132 lb 1.6 oz), SpO2 96 %.  Vitals:    08/19/20 1248 08/21/20 0648 08/22/20 0625   Weight: 65.2 kg (143 lb 11.8 oz) 61 kg (134 lb 6.4 oz) 59.9 kg (132 lb 1.6 oz)     Vital Signs with Ranges  Temp:  [95.1  F (35.1  C)-98.2  F (36.8  C)] 98.2  F (36.8  C)  Pulse:  [79-85] 85  Resp:  [16] 16  BP: (115-124)/(56-87) 123/56  SpO2:  [96 %-98 %] 96 %  I/O's Last 24 hours  I/O last 3 completed shifts:  In: 352.55 [P.O.:50; I.V.:302.55]  Out: 500 [Urine:500]  General patient appears pleasant, comfortable  Neck JVP appears normal  Cardiovascular system irregular, normal rate no murmur rub or gallop next respiratory decreased air entry bilaterally no crackles  Abdomen soft nontender  Extremities compression stockings, no particular pitting pedal edema noted  Neurological alert, oriented, power lower extremity 5 x 5.             Medications:          acetaminophen  975 mg Oral TID     aspirin  325 mg Oral Daily     aspirin  81 mg Oral Daily     atorvastatin  80 mg Oral QPM     heparin (porcine)         lidocaine (PF)         metoprolol succinate ER  25 mg Oral BID     nitroGLYcerin in D5W         sodium chloride (PF)  3 mL Intracatheter Q8H     sodium chloride (PF)  3 mL  Intracatheter Q8H     PRN Meds: Continuing ACE inhibitor/ARB/ARNI from home medication list OR ACE inhibitor/ARB order already placed during this visit, - MEDICATION INSTRUCTIONS -, cyclobenzaprine, HYDROmorphone, lidocaine 4%, lidocaine 4%, lidocaine (buffered or not buffered), lidocaine (buffered or not buffered), LORazepam **OR** LORazepam, magnesium sulfate, magnesium sulfate, melatonin, metoprolol, naloxone, ondansetron **OR** ondansetron, - MEDICATION INSTRUCTIONS -, - MEDICATION INSTRUCTIONS -, polyethylene glycol, potassium chloride, potassium chloride, potassium chloride with lidocaine, potassium chloride with lidocaine, potassium chloride, potassium chloride, potassium chloride, potassium chloride, potassium chloride, potassium chloride, potassium chloride, senna-docusate **OR** senna-docusate, sodium chloride (PF), sodium chloride (PF)         Data:      All new lab and imaging data was reviewed.   Recent Labs   Lab Test 08/22/20  0610 08/20/20  0359 08/19/20  1110   WBC 5.3 4.6 6.4   HGB 13.2 13.0 13.1   MCV 93 92 92    182 181      Recent Labs   Lab Test 08/22/20  0610 08/21/20  0633 08/20/20  1044 08/20/20  0359    136  --  139   POTASSIUM 3.9 3.8 4.3 3.3*   CHLORIDE 100 99  --  101   CO2 31 29  --  28   BUN 19 21  --  18   CR 1.25* 1.44*  --  1.34*   ANIONGAP 5 8  --  10   TORRI 8.5 8.9  --  8.6   GLC 78 99  --  84     Recent Labs   Lab Test 08/19/20  1941/20  1538 08/19/20  1110   TROPI 0.305* 0.301* 0.289*        Aníbal Schaffer MD  8/22/2020  Pager:  [phone]

## 2020-08-22 NOTE — PROGRESS NOTES
[facility]    Hospitalist Progress Note      Assessment & Plan   Noelle Mullins is a 79 year old female who was admitted on 8/19/2020.    Summary of Stay:   Noelle Mullins is a very pleasant 79 y.o. [place] woman who speaks good English and has PMH including NSTEMI (stenting of a high-grade stenosis in the proximal right coronary artery in 2017), CABG (2010), SVT, peripheral arterial disease with bilateral iliac stents.     She presented 8/19/20 presents w/ 3+ LE edema and dyspnea found to have a-fib w/ RVR, bnp elevated at 10,189 and troponin elevation of 0.289 w/ CXR showing mild to moderate left pleural effusion and mild pulmonary vascular congestion.       She was started on IV diuresis with Lasix.  Heart rate was controlled with oral metoprolol.  Started on IV heparin infusion.  Cardiology consulted.  The rate is adequately controlled.     She was started on IV Lasix 40 mg twice daily.  The Lasix was held yesterday after rise in the creatinine.    The plan was to do the cardiac cath on Friday but due to the rise in the creatinine it was postponed until Monday.    Her breathing is stable.  Denies any orthopnea.  Some shortness of breath with activity but significantly improved.    Also complained of left flank pain it is paraspinal area which he started experiencing on Friday morning.  No urinary symptoms.  Urinalysis is unremarkable.    Plan:    Heart failure with reduced ejection fraction, with acute decompensation.  - Ejection fraction 25 to 30% which is new.  Severe global hypokinesis.  Moderately decreased RV systolic function.  - Likely related to tachycardia with A. fib.  But given her coronary artery disease history, ischemic cause needed to be ruled out.  -  Initially diuresed with IV Lasix which was held on Friday due to rise in the creatinine.  Creatinine is better today.  -Continue metoprolol.  On atorvastatin 80 mg.  - IV heparin, low-dose aspirin.  - Plan for cardiac  cath on Monday.     Atrial fibrillation.  -The rate is controlled with oral metoprolol.  -On IV heparin infusion.     Acute kidney injury  - The creatinine increased from 1.18 at admission to 1.44.  Now down to 1.25  - Nonoliguric.  - Holding the losartan and Lasix for now.  Repeat in the morning.     Left flank pain.  -No radiation of the pain.  No anterior radiation.  - No hematuria grossly, no other urinary symptoms.  - UA: normal, no hematuria or pyuria  -Other etiology could be musculoskeletal pain.  No midline tenderness.  No pain on the right side.  May use ice pack or hot pack as necessary.     Bilateral lower extremity edema  -Lymphedema wraps in place.     Peripheral arterial disease.  -Continue aspirin.  Plavix is being held.  On IV heparin.     Coronary artery disease  - Overall stable.  Medication management as above.        DVT Prophylaxis: IV heparin  Code Status: Full Code  Expected discharge: Monday or Tuesday after cardiac cath.    Luis Goldman MD  Text Page (7am - 6pm, M-F)    Interval History   Patient was evaluated with nursing staff. Overnight issues discussed.    Review of systems:  No nausea or vomiting.  No abdominal pain.  No diarrhea.  No chest pain/palpitations.  No new cough/shortness of breath.  No headache/visual disturbance/new weakness.    -Data reviewed today: Labs and medications.    Physical Exam   Temp: 98.2  F (36.8  C) Temp src: Oral BP: 123/56 Pulse: 85   Resp: 16 SpO2: 96 % O2 Device: None (Room air)    Vitals:    08/19/20 1248 08/21/20 0648 08/22/20 0625   Weight: 65.2 kg (143 lb 11.8 oz) 61 kg (134 lb 6.4 oz) 59.9 kg (132 lb 1.6 oz)     Vital Signs with Ranges  Temp:  [95.1  F (35.1  C)-98.2  F (36.8  C)] 98.2  F (36.8  C)  Pulse:  [79-85] 85  Resp:  [16] 16  BP: (115-124)/(56-87) 123/56  SpO2:  [96 %-98 %] 96 %  I/O last 3 completed shifts:  In: 352.55 [P.O.:50; I.V.:302.55]  Out: 500 [Urine:500]    Constitutional: Awake, alert, cooperative, no apparent distress  HEENT:  Trachea midline, sclera is clear   Respiratory: No crackles. No wheezing. Equal breath sounds bilaterally.  Cardiovascular: Regular rate and rhythm, normal S1 and S2, and no murmur noted  GI: Normal bowel sounds, soft, non-distended, non-tender  tennderness of the left paraspinal muscle    Medications     Continuing ACE inhibitor/ARB/ARNI from home medication list OR ACE inhibitor/ARB order already placed during this visit       - MEDICATION INSTRUCTIONS -       HEParin 450 Units/hr (08/21/20 2330)     - MEDICATION INSTRUCTIONS -       - MEDICATION INSTRUCTIONS -       sodium chloride 50 mL/hr at 08/21/20 0958       acetaminophen  975 mg Oral TID     aspirin  325 mg Oral Daily     aspirin  81 mg Oral Daily     atorvastatin  80 mg Oral QPM     heparin (porcine)         lidocaine (PF)         metoprolol succinate ER  25 mg Oral BID     nitroGLYcerin in D5W         sodium chloride (PF)  3 mL Intracatheter Q8H     sodium chloride (PF)  3 mL Intracatheter Q8H       Data   Recent Labs   Lab 08/22/20  0610 08/21/20  0633 08/20/20  1044 08/20/20  0359 08/19/20  1941/20  1538 08/19/20  1110   WBC 5.3  --   --  4.6  --   --  6.4   HGB 13.2  --   --  13.0  --   --  13.1   MCV 93  --   --  92  --   --  92     --   --  182  --   --  181    136  --  139  --   --  138   POTASSIUM 3.9 3.8 4.3 3.3*  --   --  4.2   CHLORIDE 100 99  --  101  --   --  105   CO2 31 29  --  28  --   --  22   BUN 19 21  --  18  --   --  17   CR 1.25* 1.44*  --  1.34*  --   --  1.18*   ANIONGAP 5 8  --  10  --   --  11   TORRI 8.5 8.9  --  8.6  --   --  9.0   GLC 78 99  --  84  --   --  93   ALBUMIN  --   --   --  3.5  --   --   --    PROTTOTAL  --   --   --  7.0  --   --   --    BILITOTAL  --   --   --  3.3*  --   --   --    ALKPHOS  --   --   --  79  --   --   --    ALT  --   --   --  20  --   --   --    AST  --   --   --  29  --   --   --    TROPI  --   --   --   --  0.305* 0.301* 0.289*       No results found for this or any previous  visit (from the past 24 hour(s)).

## 2020-08-22 NOTE — PLAN OF CARE
A&Ox4, SBA with walker. VSS. PO tylenol given 1x for flank pain. Pt had an episode of nausea/vomiting. PO zofran given, pt said she felt much better. Purewick was put in for frequent urination since pt stated that the repeated getting up and laying back down was making her dizzy. Hep gtt @ 4.5 mL/hr. Tele A-fib RVR with PVCs. Dim LS, some fine crackles, Infrequent productive cough. Denies SOB. Lymphedema wraps removed. Will continue with POC, discharge tbd    Adalberto Hammond, RN on 8/21/2020 at 9:35 PM

## 2020-08-22 NOTE — PLAN OF CARE
A&O, forgetful. Complains of left flank pain. PRN tylenol given. SBA with walker/gait belt . Lymph wraps in place to BLE. Strict I and O. Heparin infusing. Plan for angio on Monday. Tele: NAEEM [facility]

## 2020-08-22 NOTE — PLAN OF CARE
Pt is alert and oriented, up with assist of 1. Pt denies pain overnight. VSS. 1-2+ edema to bilateral lower extremities. Pt voiding without difficulties. Heparin infusing at 450 units/hour with next 10 A this am. Possible plan for angio on Monday if flank pain resolved and creatinine improved.     Heart Failure Care Map  GOALS TO BE MET BEFORE DISCHARGE:    1. Decrease congestion and/or edema with diuretic therapy to achieve near optimal volume status.     Dyspnea improved: Yes, satisfactory for discharge.   Edema improved: No, further care required to meet this goal. Please explain continues with lymphedema wraps, diuresis as kidneys allow        Net I/O and Weights since admission:   07/23 0700 - 08/22 0659  In: 720 [P.O.:720]  Out: 2275 [Urine:2275]  Net: -1555     Vitals:    08/19/20 1248 08/21/20 0648   Weight: 65.2 kg (143 lb 11.8 oz) 61 kg (134 lb 6.4 oz)       2.  O2 sats > 92% on room air, or at prior home O2 therapy level.      Able to wean O2 this shift to keep sats above 92%?: Yes, satisfactory for discharge.   Does patient use Home O2? No          Current oxygenation status:   SpO2: 97 %     O2 Device: None (Room air),      3.  Tolerates ambulation and mobility near baseline.     Ambulation: Yes, satisfactory for discharge.   Times patient ambulated with staff this shift: 1    Please review the Heart Failure Care Map for additional HF goal outcomes.    Daniela Carrasquillo RN  8/22/2020

## 2020-08-22 NOTE — PLAN OF CARE
Discharge Planner OT   Patient plan for discharge: Home w/ family support  Current status:  Pt agreeable to re-wrapping, significant improvement noted in BLE edema, still remains in dorsum of B feet and around ankles.  LEs washed, lotion applied, and quick wraps completed to  BLEs base of toes to just below knees with appropriate stretch and spacing to allow gradient compression.  Wraps applied bilaterally. Coordinated with nursing regarding wearing schedule. Pt able to perform lymph exercises while supine 10 reps x1 set.     Pt performed bed mobility supine > sit EOB with SBA, use of bedrail. Pt completed sit> stand from EOB to FWW with CGA, ambulated to chair with FWW and CGA/SBA. Reports pain in L back and LLE. Declined further ambulation trial at this time.     Nursing: please remove BLE lymph wraps @ 0700 on 8/23, OT to rewrap on 8/23.     Barriers to return to prior living situation: Stairs, decreased functional activity tolerance/strength  Recommendations for discharge: Home w/ family assist for bathing, IADLs (homemaking, laundry, meal prep, etc) and HH PT/OT, home lymphedema  Rationale for recommendations: Pt is below baseline level of functioning with regards to ADLs/IADLs and mobility tasks, limited by fatigue. Recommend ongoing skilled OT while IP and in HH setting to improve strength, functional activity tolerance and safety needed for daily tasks. HH services indicated as leaving the home environment would require significant effort at this time.        Entered by: Jillian Ordaz 08/22/2020 8:50 AM

## 2020-08-23 ENCOUNTER — APPOINTMENT (OUTPATIENT)
Dept: ULTRASOUND IMAGING | Facility: CLINIC | Age: 80
DRG: 286 | End: 2020-08-23
Attending: INTERNAL MEDICINE
Payer: COMMERCIAL

## 2020-08-23 ENCOUNTER — APPOINTMENT (OUTPATIENT)
Dept: OCCUPATIONAL THERAPY | Facility: CLINIC | Age: 80
DRG: 286 | End: 2020-08-23
Payer: COMMERCIAL

## 2020-08-23 LAB
ANION GAP SERPL CALCULATED.3IONS-SCNC: 4 MMOL/L (ref 3–14)
BUN SERPL-MCNC: 26 MG/DL (ref 7–30)
CALCIUM SERPL-MCNC: 8.5 MG/DL (ref 8.5–10.1)
CHLORIDE SERPL-SCNC: 100 MMOL/L (ref 94–109)
CO2 SERPL-SCNC: 30 MMOL/L (ref 20–32)
CREAT SERPL-MCNC: 1.54 MG/DL (ref 0.52–1.04)
GFR SERPL CREATININE-BSD FRML MDRD: 32 ML/MIN/{1.73_M2}
GLUCOSE SERPL-MCNC: 94 MG/DL (ref 70–99)
LMWH PPP CHRO-ACNC: 0.35 IU/ML
LMWH PPP CHRO-ACNC: 0.42 IU/ML
POTASSIUM SERPL-SCNC: 4.3 MMOL/L (ref 3.4–5.3)
SODIUM SERPL-SCNC: 134 MMOL/L (ref 133–144)

## 2020-08-23 PROCEDURE — 36415 COLL VENOUS BLD VENIPUNCTURE: CPT | Performed by: INTERNAL MEDICINE

## 2020-08-23 PROCEDURE — 25000132 ZZH RX MED GY IP 250 OP 250 PS 637: Performed by: INTERNAL MEDICINE

## 2020-08-23 PROCEDURE — 25000128 H RX IP 250 OP 636: Performed by: INTERNAL MEDICINE

## 2020-08-23 PROCEDURE — 99233 SBSQ HOSP IP/OBS HIGH 50: CPT | Performed by: INTERNAL MEDICINE

## 2020-08-23 PROCEDURE — 12000000 ZZH R&B MED SURG/OB

## 2020-08-23 PROCEDURE — 80048 BASIC METABOLIC PNL TOTAL CA: CPT | Performed by: INTERNAL MEDICINE

## 2020-08-23 PROCEDURE — 76770 US EXAM ABDO BACK WALL COMP: CPT

## 2020-08-23 PROCEDURE — 97140 MANUAL THERAPY 1/> REGIONS: CPT | Mod: GO

## 2020-08-23 PROCEDURE — 97535 SELF CARE MNGMENT TRAINING: CPT | Mod: GO

## 2020-08-23 PROCEDURE — 85520 HEPARIN ASSAY: CPT | Performed by: INTERNAL MEDICINE

## 2020-08-23 RX ADMIN — ACETAMINOPHEN 975 MG: 325 TABLET, FILM COATED ORAL at 08:10

## 2020-08-23 RX ADMIN — ATORVASTATIN CALCIUM 80 MG: 40 TABLET, FILM COATED ORAL at 20:03

## 2020-08-23 RX ADMIN — ASPIRIN 81 MG: 81 TABLET, COATED ORAL at 08:10

## 2020-08-23 RX ADMIN — METOPROLOL SUCCINATE 25 MG: 25 TABLET, EXTENDED RELEASE ORAL at 08:10

## 2020-08-23 RX ADMIN — HEPARIN SODIUM 350 UNITS/HR: 10000 INJECTION, SOLUTION INTRAVENOUS at 17:43

## 2020-08-23 RX ADMIN — ACETAMINOPHEN 975 MG: 325 TABLET, FILM COATED ORAL at 15:56

## 2020-08-23 RX ADMIN — METOPROLOL SUCCINATE 25 MG: 25 TABLET, EXTENDED RELEASE ORAL at 20:03

## 2020-08-23 RX ADMIN — ACETAMINOPHEN 975 MG: 325 TABLET, FILM COATED ORAL at 21:15

## 2020-08-23 ASSESSMENT — ACTIVITIES OF DAILY LIVING (ADL)
ADLS_ACUITY_SCORE: 12

## 2020-08-23 ASSESSMENT — MIFFLIN-ST. JEOR: SCORE: 1013.86

## 2020-08-23 NOTE — PLAN OF CARE
Heart Failure Care Map  GOALS TO BE MET BEFORE DISCHARGE:    1. Decrease congestion and/or edema with diuretic therapy to achieve near optimal volume status.     Dyspnea improved: Yes, satisfactory for discharge.   Edema improved: No, further care required to meet this goal. Please explain no changes        Net I/O and Weights since admission:   07/24 2300 - 08/23 2259  In: 2200.85 [P.O.:1450; I.V.:750.85]  Out: 2875 [Urine:2875]  Net: -674.15     Vitals:    08/19/20 1248 08/21/20 0648 08/22/20 0625 08/23/20 0653   Weight: 65.2 kg (143 lb 11.8 oz) 61 kg (134 lb 6.4 oz) 59.9 kg (132 lb 1.6 oz) 61.2 kg (135 lb)       2.  O2 sats > 92% on room air, or at prior home O2 therapy level.      Able to wean O2 this shift to keep sats above 92%?: Yes, satisfactory for discharge.   Does patient use Home O2? No          Current oxygenation status:   SpO2: 93 %     O2 Device: None (Room air),      3.  Tolerates ambulation and mobility near baseline.     Ambulation: Yes, satisfactory for discharge.   Times patient ambulated with staff this shift: 1    Please review the Heart Failure Care Map for additional HF goal outcomes.      A&Ox4, VSS, scheduled Tylenol for left flank pain,planned for Coronary Angio tomorrow but  Cr elevated todayat 1.54, will recheck tomorrow , Cardiology following, will recheck Cr tomorrow, will continue with POC.    RACQUEL DOS SANTOS RN  8/23/2020

## 2020-08-23 NOTE — PROGRESS NOTES
[facility]    Hospitalist Progress Note      Assessment & Plan   Noelle Mullins is a 79 year old female who was admitted on 8/19/2020.    Summary of Stay:   Noelle Mullins is a very pleasant 79 y.o. [place] woman who speaks good English and has PMH including NSTEMI (stenting of a high-grade stenosis in the proximal right coronary artery in 2017), CABG (2010), SVT, peripheral arterial disease with bilateral iliac stents.     She presented 8/19/20 presents w/ 3+ LE edema and dyspnea found to have a-fib w/ RVR, bnp elevated at 10,189 and troponin elevation of 0.289 w/ CXR showing mild to moderate left pleural effusion and mild pulmonary vascular congestion.       She was started on IV diuresis with Lasix.  Heart rate was controlled with oral metoprolol.  Started on IV heparin infusion.  Cardiology consulted.  The rate is adequately controlled.      She was started on IV Lasix 40 mg twice daily.  The Lasix was held yesterday after rise in the creatinine.     The plan was to do the cardiac cath on Friday but due to the rise in the creatinine it was postponed until Monday.     Her breathing is stable.  Denies any orthopnea.  Some shortness of breath with activity but significantly improved.     Also complained of left flank pain it is paraspinal area which he started experiencing on Friday morning.  No urinary symptoms.  Urinalysis is unremarkable.    Plan:  Heart failure with reduced ejection fraction, with acute decompensation.  - Ejection fraction 25 to 30% which is new.  Severe global hypokinesis.  Moderately decreased RV systolic function.  - Likely related to tachycardia with A. fib.  But given her coronary artery disease history, ischemic cause needed to be ruled out.  -  Initially diuresed with IV Lasix which was held on Friday due to rise in the creatinine.  -Continue metoprolol.  On atorvastatin 80 mg.  - IV heparin, low-dose aspirin.  - Plan for cardiac cath on Monday, depending on  kidney functions.     Atrial fibrillation.  -The rate is controlled with oral metoprolol.  -On IV heparin infusion.     Acute kidney injury.  Worsened.  - Creatinine was 1.18 at admission, -> 1.34 -> 1.44 -> 1.25 -> 1.54 today  - Nonoliguric.  - Holding the losartan and Lasix for now.  Repeat in the morning.  - Check renal ultrasound.  - Normal urinalysis with no protein or RBCs.     Left flank pain.  -Today she is experiencing some pain anteriorly as well.  The flank pain itself is better.  - No hematuria grossly, no other urinary symptoms.  - UA: normal, no hematuria or pyuria  -Check renal ultrasound.     Bilateral lower extremity edema  -Lymphedema wraps in place.     Peripheral arterial disease.  -Continue aspirin.  Plavix is being held.  On IV heparin.     Coronary artery disease  - Overall stable.  Medication management as above.        DVT Prophylaxis: IV heparin  Code Status: Full Code  Expected discharge: Probably next 2 to 3 days.    Luis Goldman MD  Text Page (7am - 6pm, M-F)    Interval History   Patient was evaluated with nursing staff. Overnight issues discussed.    Overall in good spirits.  Left posterior flank pain is better but today experiencing some pain anteriorly in the left lumbar/left lower quadrant.    Review of systems:  No nausea or vomiting. No diarrhea.  No chest pain/palpitations.  No new cough/shortness of breath.  No headache/visual disturbance/new weakness.    -Data reviewed today: Labs and medications.    Physical Exam   Temp: 98  F (36.7  C) Temp src: Oral BP: 106/60 Pulse: 87   Resp: 16 SpO2: 94 % O2 Device: None (Room air)    Vitals:    08/21/20 0648 08/22/20 0625 08/23/20 0653   Weight: 61 kg (134 lb 6.4 oz) 59.9 kg (132 lb 1.6 oz) 61.2 kg (135 lb)     Vital Signs with Ranges  Temp:  [98  F (36.7  C)-98.3  F (36.8  C)] 98  F (36.7  C)  Pulse:  [81-95] 87  Resp:  [16] 16  BP: ()/(59-68) 106/60  SpO2:  [94 %-97 %] 94 %  I/O last 3 completed shifts:  In: 849 [P.O.:480;  I.V.:369]  Out: 250 [Urine:250]    Constitutional: Awake, alert, cooperative, no apparent distress  HEENT: Trachea midline, sclera is clear   Respiratory: No crackles. No wheezing. Equal breath sounds bilaterally.  Cardiovascular: normal S1 and S2, and no murmur noted, edema present.  GI: Normal bowel sounds, soft, non-distended,   Left flank tenderness, along the paraspinal muscles but better.  Mild left lumbar/lower quadrant tenderness.    Medications     Continuing ACE inhibitor/ARB/ARNI from home medication list OR ACE inhibitor/ARB order already placed during this visit       - MEDICATION INSTRUCTIONS -       HEParin 350 Units/hr (08/23/20 0820)     - MEDICATION INSTRUCTIONS -       - MEDICATION INSTRUCTIONS -         acetaminophen  975 mg Oral TID     aspirin  81 mg Oral Daily     atorvastatin  80 mg Oral QPM     heparin (porcine)         lidocaine (PF)         metoprolol succinate ER  25 mg Oral BID     nitroGLYcerin in D5W         sodium chloride (PF)  3 mL Intracatheter Q8H     sodium chloride (PF)  3 mL Intracatheter Q8H       Data   Recent Labs   Lab 08/23/20  0724 08/22/20  0610 08/21/20  0633  08/20/20  0359 08/19/20  1941/20  1538 08/19/20  1110   WBC  --  5.3  --   --  4.6  --   --  6.4   HGB  --  13.2  --   --  13.0  --   --  13.1   MCV  --  93  --   --  92  --   --  92   PLT  --  190  --   --  182  --   --  181    136 136  --  139  --   --  138   POTASSIUM 4.3 3.9 3.8   < > 3.3*  --   --  4.2   CHLORIDE 100 100 99  --  101  --   --  105   CO2 30 31 29  --  28  --   --  22   BUN 26 19 21  --  18  --   --  17   CR 1.54* 1.25* 1.44*  --  1.34*  --   --  1.18*   ANIONGAP 4 5 8  --  10  --   --  11   TORRI 8.5 8.5 8.9  --  8.6  --   --  9.0   GLC 94 78 99  --  84  --   --  93   ALBUMIN  --   --   --   --  3.5  --   --   --    PROTTOTAL  --   --   --   --  7.0  --   --   --    BILITOTAL  --   --   --   --  3.3*  --   --   --    ALKPHOS  --   --   --   --  79  --   --   --    ALT  --   --   --    --  20  --   --   --    AST  --   --   --   --  29  --   --   --    TROPI  --   --   --   --   --  0.305* 0.301* 0.289*    < > = values in this interval not displayed.       No results found for this or any previous visit (from the past 24 hour(s)).

## 2020-08-23 NOTE — PLAN OF CARE
Discharge Planner OT   Patient plan for discharge: Home w/ family support  Current status:  Pt agreeable to re-wrapping, significant improvement noted in BLE edema, still slight pitting remains in dorsum of B feet and at base of toes.  LEs washed, lotion applied, and quick wraps completed to  BLEs base of toes to just below knees with appropriate stretch and spacing to allow gradient compression.  Wraps applied bilaterally. Coordinated with nursing regarding wearing schedule. Pt able to perform lymph exercises while supine 10 reps x1 set.     Pt performed bed mobility supine > sit EOB with SBA, use of bedrail. Pt completed sit> stand from EOB to FWW with CGA, ambulated to BR, performed toilet transfer and toileting tasks with CGA, ambulated to bedside chair with FWW and CGA/SBA. Mild dizziness reported initially with standing, resolved with time.     Nursing: please remove BLE lymph wraps @ 0700 on 8/24, OT to rewrap on 8/24.     Barriers to return to prior living situation: Stairs, decreased functional activity tolerance/strength  Recommendations for discharge: Home w/ family assist for bathing, IADLs (homemaking, laundry, meal prep, etc) and HH PT/OT, home lymphedema  Rationale for recommendations: Pt is below baseline level of functioning with regards to ADLs/IADLs and mobility tasks, limited by fatigue. Recommend ongoing skilled OT while IP and in HH setting to improve strength, functional activity tolerance and safety needed for daily tasks. HH services indicated as leaving the home environment would require significant effort at this time.        Entered by: Jillian Ordaz 08/23/2020 8:49 AM

## 2020-08-23 NOTE — PLAN OF CARE
Heart Failure Care Map  GOALS TO BE MET BEFORE DISCHARGE:    1. Decrease congestion and/or edema with diuretic therapy to achieve near optimal volume status.     Dyspnea improved: Yes, satisfactory for discharge.   Edema improved: No, further care required to meet this goal. Please explain no changes, BLE with lymphedema wraps        Net I/O and Weights since admission:   07/23 2300 - 08/22 2259  In: 1921.55 [P.O.:1250; I.V.:671.55]  Out: 2725 [Urine:2725]  Net: -803.45     Vitals:    08/19/20 1248 08/21/20 0648 08/22/20 0625   Weight: 65.2 kg (143 lb 11.8 oz) 61 kg (134 lb 6.4 oz) 59.9 kg (132 lb 1.6 oz)       2.  O2 sats > 92% on room air, or at prior home O2 therapy level.      Able to wean O2 this shift to keep sats above 92%?: Yes, satisfactory for discharge.   Does patient use Home O2? No          Current oxygenation status:   SpO2: 96 %     O2 Device: None (Room air),      3.  Tolerates ambulation and mobility near baseline.     Ambulation: Yes, satisfactory for discharge.   Times patient ambulated with staff this shift: 1    Please review the Heart Failure Care Map for additional HF goal outcomes.    VSS, scheduled Tylenol for left flank pain, up with SBA, Cr 1.25, plan for Coronary Angiogram on Monday, Tele Afib CVR, Heparin gtt@4.5ml/hr, will continue with POC.        RACQUEL DOS SANTOS RN  8/22/2020

## 2020-08-23 NOTE — PLAN OF CARE
Heart Failure Care Map  GOALS TO BE MET BEFORE DISCHARGE:    1. Decrease congestion and/or edema with diuretic therapy to achieve near optimal volume status.     Dyspnea improved: Yes, satisfactory for discharge.   Edema improved: No, further care required to meet this goal. Please explain Pt. has lymphedema wraps. Wraps removed this AM.       Net I/O and Weights since admission:   07/24 0700 - 08/23 0659  In: 1921.55 [P.O.:1250; I.V.:671.55]  Out: 2725 [Urine:2725]  Net: -803.45     Vitals:    08/19/20 1248 08/21/20 0648 08/22/20 0625 08/23/20 0653   Weight: 65.2 kg (143 lb 11.8 oz) 61 kg (134 lb 6.4 oz) 59.9 kg (132 lb 1.6 oz) 61.2 kg (135 lb)       2.  O2 sats > 92% on room air, or at prior home O2 therapy level.      Able to wean O2 this shift to keep sats above 92%?: Yes, satisfactory for discharge.   Does patient use Home O2? No          Current oxygenation status:   SpO2: 95 %     O2 Device: None (Room air),      3.  Tolerates ambulation and mobility near baseline.     Ambulation: Yes, satisfactory for discharge.   Times patient ambulated with staff this shift: 1    Please review the Heart Failure Care Map for additional HF goal outcomes.    Ирина Coughlin RN  8/23/2020     Pt. AxOx4, up with assist of 1 and walker, Tele: A.fib HR 90's. Heparin drip infusing at 450 units/hr, Hep10a due this AM. Lung sounds diminished, fine crackles in bases, Room air sat's at 95%. Pt. Continues to have 3+LE edema, Lymphedema wraps removed this AM. Plan for Angiogram on Monday. Pt. Denies any needs at this time. Will continue with POC.

## 2020-08-23 NOTE — PROGRESS NOTES
[place] Progress Note     Aníbal Schaffer MD  08/23/2020         Interval History:      No chest discomfort, shortness with dizziness presyncope or syncope.  Unfortunately creatinine worsened.       Assessment and Plan:      79-year-old female with CAD, CABG 2010 (LIMA to LAD, radial artery to RCA, SVG to OM, patent grafts on coronary angiogram 2017 but she underwent proximal RCA native vessel PCI for non-ST was microinfarction), admitted with acute decompensated congestive heart failure with severe reduced LV systolic function with LVEF 25 to 30% with severe global hypokinesia, newly diagnosed atrial fibrillation with rapid ventricular rate.  She was managed on rate control strategy and started on heparin drip.  She was set up for coronary angiogram but this had to be canceled last Friday because of acute flank pain and worsening of kidney functions.  Kidney function did recover somewhat but again worsened today.  She has been off losartan and Lasix for last few days.  On examination she appears compensated not significantly volume overloaded at this time.  Given worsening kidney functions I would recommend withholding coronary angiogram till her kidney functions recover.     1.  Newly diagnosed acute systolic congestive heart failure with severe reduced LV systolic function.  Etiology could be tachycardia mediated cardiomyopathy due to recently diagnosed atrial fibrillation with rapid ventricular rate.  Ischemic cardiomyopathy cannot entirely rule out.  2.  Newly diagnosed atrial fibrillation with rapid ventricular rate.  On rate control strategy.  Ventricular rates appear well-controlled on current dose of beta-blocker.  Chads 2 Vascor of 5, on heparin drip.  This can be changed to oral anticoagulation post coronary angiogram.  3.  Known CAD with the CABG in 2010, coronary angiogram in 2017 showed patent LIMA to LAD, radial to RCA, SVG to OM graft.  She underwent native proximal RCA PCI at that time.  LVEF 55%  echo in 2017.  4.  Peripheral vascular disease with reported history of right common iliac artery stent.  6.  Acute renal failure.  Creatinine worse today.    Recommendations  Continue heparin drip, aspirin, statin, beta-blocker.  Reasonable to continue to hold Lasix, losartan as creatinine again worsened today.  Given worsening kidney function will hold on coronary angiogram which was planned for tomorrow.  Kidney functions will need to be reassessed tomorrow before proceeding with coronary angiogram.       Physical Exam:       , Blood pressure 106/60, pulse 87, temperature 98  F (36.7  C), temperature source Oral, resp. rate 16, height 1.524 m (5'), weight 61.2 kg (135 lb), SpO2 94 %.  Vitals:    08/21/20 0648 08/22/20 0625 08/23/20 0653   Weight: 61 kg (134 lb 6.4 oz) 59.9 kg (132 lb 1.6 oz) 61.2 kg (135 lb)     Vital Signs with Ranges  Temp:  [98  F (36.7  C)-98.3  F (36.8  C)] 98  F (36.7  C)  Pulse:  [81-95] 87  Resp:  [16] 16  BP: ()/(59-68) 106/60  SpO2:  [94 %-97 %] 94 %  I/O's Last 24 hours  I/O last 3 completed shifts:  In: 849 [P.O.:480; I.V.:369]  Out: 250 [Urine:250]  General patient appears pleasant, comfortable  Neck JVP appears normal  Cardiovascular system irregular, normal rate no murmur rub or gallop next respiratory decreased air entry bilaterally no crackles  Abdomen soft nontender  Extremities compression stockings, no particular pitting pedal edema noted  Neurological alert, oriented.             Medications:          acetaminophen  975 mg Oral TID     aspirin  81 mg Oral Daily     atorvastatin  80 mg Oral QPM     heparin (porcine)         lidocaine (PF)         metoprolol succinate ER  25 mg Oral BID     nitroGLYcerin in D5W         sodium chloride (PF)  3 mL Intracatheter Q8H     sodium chloride (PF)  3 mL Intracatheter Q8H     PRN Meds: Continuing ACE inhibitor/ARB/ARNI from home medication list OR ACE inhibitor/ARB order already placed during this visit, - MEDICATION INSTRUCTIONS -,  cyclobenzaprine, HYDROmorphone, lidocaine 4%, lidocaine 4%, lidocaine (buffered or not buffered), lidocaine (buffered or not buffered), LORazepam **OR** LORazepam, magnesium sulfate, magnesium sulfate, melatonin, metoprolol, naloxone, ondansetron **OR** ondansetron, - MEDICATION INSTRUCTIONS -, - MEDICATION INSTRUCTIONS -, polyethylene glycol, potassium chloride, potassium chloride with lidocaine, potassium chloride, potassium chloride, potassium chloride, potassium chloride, senna-docusate **OR** senna-docusate, sodium chloride (PF), sodium chloride (PF)         Data:      All new lab and imaging data was reviewed.   Recent Labs   Lab Test 08/22/20  0610 08/20/20  0359 08/19/20  1110   WBC 5.3 4.6 6.4   HGB 13.2 13.0 13.1   MCV 93 92 92    182 181      Recent Labs   Lab Test 08/23/20  0724 08/22/20  0610 08/21/20  0633    136 136   POTASSIUM 4.3 3.9 3.8   CHLORIDE 100 100 99   CO2 30 31 29   BUN 26 19 21   CR 1.54* 1.25* 1.44*   ANIONGAP 4 5 8   TORRI 8.5 8.5 8.9   GLC 94 78 99     Recent Labs   Lab Test 08/19/20  1941/20  1538 08/19/20  1110   TROPI 0.305* 0.301* 0.289*        Aníbal Schaffer MD  8/23/2020  Pager:  [phone]

## 2020-08-24 ENCOUNTER — APPOINTMENT (OUTPATIENT)
Dept: OCCUPATIONAL THERAPY | Facility: CLINIC | Age: 80
DRG: 286 | End: 2020-08-24
Payer: COMMERCIAL

## 2020-08-24 LAB
ANION GAP SERPL CALCULATED.3IONS-SCNC: 6 MMOL/L (ref 3–14)
BUN SERPL-MCNC: 23 MG/DL (ref 7–30)
CALCIUM SERPL-MCNC: 8.3 MG/DL (ref 8.5–10.1)
CHLORIDE SERPL-SCNC: 101 MMOL/L (ref 94–109)
CO2 SERPL-SCNC: 27 MMOL/L (ref 20–32)
CREAT SERPL-MCNC: 1.27 MG/DL (ref 0.52–1.04)
GFR SERPL CREATININE-BSD FRML MDRD: 40 ML/MIN/{1.73_M2}
GLUCOSE BLDC GLUCOMTR-MCNC: 131 MG/DL (ref 70–99)
GLUCOSE SERPL-MCNC: 78 MG/DL (ref 70–99)
LMWH PPP CHRO-ACNC: 0.26 IU/ML
MAGNESIUM SERPL-MCNC: 2.2 MG/DL (ref 1.6–2.3)
POTASSIUM SERPL-SCNC: 3.9 MMOL/L (ref 3.4–5.3)
SODIUM SERPL-SCNC: 134 MMOL/L (ref 133–144)

## 2020-08-24 PROCEDURE — 36415 COLL VENOUS BLD VENIPUNCTURE: CPT | Performed by: INTERNAL MEDICINE

## 2020-08-24 PROCEDURE — 97530 THERAPEUTIC ACTIVITIES: CPT | Mod: GO

## 2020-08-24 PROCEDURE — 99233 SBSQ HOSP IP/OBS HIGH 50: CPT | Performed by: INTERNAL MEDICINE

## 2020-08-24 PROCEDURE — 25000132 ZZH RX MED GY IP 250 OP 250 PS 637: Performed by: INTERNAL MEDICINE

## 2020-08-24 PROCEDURE — 00000146 ZZHCL STATISTIC GLUCOSE BY METER IP

## 2020-08-24 PROCEDURE — 25000128 H RX IP 250 OP 636: Performed by: INTERNAL MEDICINE

## 2020-08-24 PROCEDURE — 99232 SBSQ HOSP IP/OBS MODERATE 35: CPT | Performed by: INTERNAL MEDICINE

## 2020-08-24 PROCEDURE — 80048 BASIC METABOLIC PNL TOTAL CA: CPT | Performed by: INTERNAL MEDICINE

## 2020-08-24 PROCEDURE — 85520 HEPARIN ASSAY: CPT | Performed by: INTERNAL MEDICINE

## 2020-08-24 PROCEDURE — 25000128 H RX IP 250 OP 636: Performed by: PHYSICIAN ASSISTANT

## 2020-08-24 PROCEDURE — 83735 ASSAY OF MAGNESIUM: CPT | Performed by: INTERNAL MEDICINE

## 2020-08-24 PROCEDURE — 12000000 ZZH R&B MED SURG/OB

## 2020-08-24 PROCEDURE — 99207 ZZC CDG-MDM COMPONENT: MEETS MODERATE - UP CODED: CPT | Performed by: INTERNAL MEDICINE

## 2020-08-24 PROCEDURE — 25000132 ZZH RX MED GY IP 250 OP 250 PS 637: Performed by: PHYSICIAN ASSISTANT

## 2020-08-24 RX ADMIN — ASPIRIN 81 MG: 81 TABLET, COATED ORAL at 07:32

## 2020-08-24 RX ADMIN — ACETAMINOPHEN 975 MG: 325 TABLET, FILM COATED ORAL at 13:59

## 2020-08-24 RX ADMIN — CYCLOBENZAPRINE HYDROCHLORIDE 10 MG: 5 TABLET, FILM COATED ORAL at 06:06

## 2020-08-24 RX ADMIN — POTASSIUM CHLORIDE 20 MEQ: 1500 TABLET, EXTENDED RELEASE ORAL at 20:02

## 2020-08-24 RX ADMIN — ONDANSETRON 4 MG: 2 INJECTION INTRAMUSCULAR; INTRAVENOUS at 20:27

## 2020-08-24 RX ADMIN — ACETAMINOPHEN 975 MG: 325 TABLET, FILM COATED ORAL at 21:46

## 2020-08-24 RX ADMIN — HYDROMORPHONE HYDROCHLORIDE 0.2 MG: 1 INJECTION, SOLUTION INTRAMUSCULAR; INTRAVENOUS; SUBCUTANEOUS at 20:08

## 2020-08-24 RX ADMIN — METOPROLOL SUCCINATE 25 MG: 25 TABLET, EXTENDED RELEASE ORAL at 07:32

## 2020-08-24 RX ADMIN — HYDROMORPHONE HYDROCHLORIDE 0.2 MG: 1 INJECTION, SOLUTION INTRAMUSCULAR; INTRAVENOUS; SUBCUTANEOUS at 16:27

## 2020-08-24 RX ADMIN — METOPROLOL SUCCINATE 25 MG: 25 TABLET, EXTENDED RELEASE ORAL at 20:02

## 2020-08-24 RX ADMIN — ATORVASTATIN CALCIUM 80 MG: 40 TABLET, FILM COATED ORAL at 20:01

## 2020-08-24 RX ADMIN — POLYETHYLENE GLYCOL 3350 17 G: 17 POWDER, FOR SOLUTION ORAL at 14:37

## 2020-08-24 ASSESSMENT — ACTIVITIES OF DAILY LIVING (ADL)
ADLS_ACUITY_SCORE: 12
ADLS_ACUITY_SCORE: 16
ADLS_ACUITY_SCORE: 12

## 2020-08-24 ASSESSMENT — MIFFLIN-ST. JEOR: SCORE: 994.35

## 2020-08-24 NOTE — PROGRESS NOTES
[facility]    Hospitalist Progress Note      Assessment & Plan   Noelle Mullins is a 79 year old female who was admitted on 8/19/2020.    Summary of Stay:   Noelle Mullins is a very pleasant 79 y.o. [place] woman who speaks good English and has PMH including NSTEMI (stenting of a high-grade stenosis in the proximal right coronary artery in 2017), CABG (2010), SVT, peripheral arterial disease with bilateral iliac stents.     She presented 8/19/20 presents w/ 3+ LE edema and dyspnea found to have a-fib w/ RVR, bnp elevated at 10,189 and troponin elevation of 0.289 w/ CXR showing mild to moderate left pleural effusion and mild pulmonary vascular congestion.       She was started on IV diuresis with Lasix.  Heart rate was controlled with oral metoprolol.  Started on IV heparin infusion.  Cardiology consulted.  The rate is adequately controlled with oral metoprolol.      She was started on IV Lasix 40 mg twice daily.  The Lasix was held after rise in the creatinine.     The plan was to do the cardiac cath on Friday but due to the rise in the creatinine it was postponed.     Her breathing is stable.  Denies any orthopnea.  Some shortness of breath with activity but significantly improved.     Also complained of left flank pain it is paraspinal area which he started experiencing on Friday morning.  No urinary symptoms.  Urinalysis is unremarkable.     Plan:  Heart failure with reduced ejection fraction, with acute decompensation.  - Ejection fraction 25 to 30% which is new.  Severe global hypokinesis.  Moderately decreased RV systolic function.  - Likely related to tachycardia with A. fib.  But given her coronary artery disease history, ischemic cause needed to be ruled out.  -  Initially diuresed with IV Lasix which was held on Friday due to rise in the creatinine.  -Continue metoprolol.  On atorvastatin 80 mg.  - IV heparin, aspirin.  - Plan for cardiac cath per cardiology, possibly  tomorrow     Atrial fibrillation.  -The rate is controlled with oral metoprolol.  -On IV heparin infusion.     Acute kidney injury.    - Creatinine was 1.18 at admission, -> 1.34 -> 1.44 -> 1.25 -> 1.54 -> 1.27 today  - Nonoliguric.  - Holding the losartan and Lasix for now.  - renal ultrasound: unremarkable  - Normal urinalysis with no protein or RBCs.     Left flank pain.  -stable, tender spot of left paraspinal muscle.   - No hematuria grossly, no other urinary symptoms.  - UA: normal, no hematuria or pyuria  - renal ultrasound: unremarkable  - likely musculoskeletal in nature     Bilateral lower extremity edema  -Lymphedema wraps in place.     Peripheral arterial disease.  -Continue aspirin.  Plavix is being held.  On IV heparin.     Coronary artery disease  - Overall stable.  Medication management as above.        DVT Prophylaxis: IV heparin  Code Status: Full Code  Expected discharge: Probably next 1-2 days    Luis Goldman MD  [facility] (7am - 6pm, M-F)    Interval History   Patient was evaluated with nursing staff. Overnight issues discussed.    Review of systems:  No nausea or vomiting.  No abdominal pain.  No diarrhea.  No chest pain/palpitations.  No new cough/shortness of breath.  No headache/visual disturbance/new weakness.    -Data reviewed today: Labs and medications.    Physical Exam   Temp: 98  F (36.7  C) Temp src: Oral BP: 124/68 Pulse: 93   Resp: 16 SpO2: 93 % O2 Device: None (Room air)    Vitals:    08/22/20 0625 08/23/20 0653 08/24/20 0608   Weight: 59.9 kg (132 lb 1.6 oz) 61.2 kg (135 lb) 59.3 kg (130 lb 11.2 oz)     Vital Signs with Ranges  Temp:  [97.9  F (36.6  C)-98  F (36.7  C)] 98  F (36.7  C)  Pulse:  [87-95] 93  Resp:  [16] 16  BP: (113-124)/(68-80) 124/68  SpO2:  [93 %-96 %] 93 %  I/O last 3 completed shifts:  In: 297.3 [P.O.:200; I.V.:97.3]  Out: 450 [Urine:450]    Constitutional: Awake, alert, cooperative, no apparent distress  HEENT: Trachea midline, sclera is clear   Respiratory:  No crackles. No wheezing. Equal breath sounds bilaterally.  Cardiovascular: Regular rate and rhythm, normal S1 and S2, and no murmur noted  GI: Normal bowel sounds, soft, non-distended  Tender left flank posteriorly, paraspinal muscle    Medications     Continuing ACE inhibitor/ARB/ARNI from home medication list OR ACE inhibitor/ARB order already placed during this visit       - MEDICATION INSTRUCTIONS -       HEParin 350 Units/hr (08/24/20 1032)     - MEDICATION INSTRUCTIONS -       - MEDICATION INSTRUCTIONS -         acetaminophen  975 mg Oral TID     aspirin  81 mg Oral Daily     atorvastatin  80 mg Oral QPM     heparin (porcine)         lidocaine (PF)         metoprolol succinate ER  25 mg Oral BID     nitroGLYcerin in D5W         sodium chloride (PF)  3 mL Intracatheter Q8H     sodium chloride (PF)  3 mL Intracatheter Q8H       Data   Recent Labs   Lab 08/24/20  0721 08/23/20  0724 08/22/20  0610  08/20/20  0359 08/19/20  1941/20  1538 08/19/20  1110   WBC  --   --  5.3  --  4.6  --   --  6.4   HGB  --   --  13.2  --  13.0  --   --  13.1   MCV  --   --  93  --  92  --   --  92   PLT  --   --  190  --  182  --   --  181    134 136   < > 139  --   --  138   POTASSIUM 3.9 4.3 3.9   < > 3.3*  --   --  4.2   CHLORIDE 101 100 100   < > 101  --   --  105   CO2 27 30 31   < > 28  --   --  22   BUN 23 26 19   < > 18  --   --  17   CR 1.27* 1.54* 1.25*   < > 1.34*  --   --  1.18*   ANIONGAP 6 4 5   < > 10  --   --  11   TORRI 8.3* 8.5 8.5   < > 8.6  --   --  9.0   GLC 78 94 78   < > 84  --   --  93   ALBUMIN  --   --   --   --  3.5  --   --   --    PROTTOTAL  --   --   --   --  7.0  --   --   --    BILITOTAL  --   --   --   --  3.3*  --   --   --    ALKPHOS  --   --   --   --  79  --   --   --    ALT  --   --   --   --  20  --   --   --    AST  --   --   --   --  29  --   --   --    TROPI  --   --   --   --   --  0.305* 0.301* 0.289*    < > = values in this interval not displayed.       No results found for  this or any previous visit (from the past 24 hour(s)).

## 2020-08-24 NOTE — PLAN OF CARE
AOx4 with occasional forgetfulness, up SBA with walker GB to bathroom. Low fat/sodium diet. Heparin gtt @ 3.5/hr.   VSS on RA, denies pain.   Tele; Afib CVR.   Plan was for pt to have angiogram today; however, creatinine is once again elevated. No signed and held orders for angiogram preparation.     Lymph wraps removed at 0630 this morning.

## 2020-08-24 NOTE — PLAN OF CARE
Discharge Planner OT   Patient plan for discharge: Home w/ family support  Current status: Pt appears to have significantly improved LE edema. No edema noted on assessment. Pt agreeable to leaving wraps off for a day to determine if light compression needed for continued management. Educated on elevated feet, and foot pumpts. Pt completes ambulation 75 feet in hallways.  Vitas stable.    Prior to activity   Following activity  HR      86 BPM   81 BPM  BP 97/67    113/76  Barriers to return to prior living situation: Stairs, decreased functional activity tolerance/strength  Recommendations for discharge: Home w/ family assist for bathing, IADLs (homemaking, laundry, meal prep, etc) and HH PT/OT, home lymphedema  Rationale for recommendations: Pt is below baseline level of functioning with regards to ADLs/IADLs and mobility tasks, limited by fatigue. Recommend ongoing skilled OT while IP and in HH setting to improve strength, functional activity tolerance and safety needed for daily tasks. HH services indicated as leaving the home environment would require significant effort at this time.

## 2020-08-24 NOTE — PLAN OF CARE
Neuro: Alert but fatigued. Forgetful. Oriented to situation, year, place.  CV/tele: A-fib w/ CVR (rate 70s-90s). Edema in BLEs continues to be well-managed w/ trace edema bilaterally. One 5 beat run of VT. Toprol XL increased to 25mg BID.  Resp: Lung sounds diminished. Intermittent cough w/ small amt white sputum.   GI: Poor appetite. LBM 8/21. Pt given PRN miralax today.  : Voids  Skin: WNL  IV: R PIV in place w/ heparin @ 3.5 (rate unchanged today)  Pertinent labs: Cr 1.27 (improving). Mag 2.2. K 3.9.    Shift summary: Pt reports ongoing fatigue and poor appetite today. Creatinine improving but angio not performed today dt more urgent cases in cath lab. Plan for angio tomorrow depending on creatinine. Intermittent L flank pain (suspect musculoskeletal or dt constipation; using heating pads and tylenol for mgmt)

## 2020-08-24 NOTE — PROGRESS NOTES
[facility]    Cardiology Progress Note    Date of Service (when I saw the patient): 08/24/2020     Primary cardiologist: Dr Johnson with [facility]    Assessment & Plan   Noelle Mullins is a 79 year old female who was admitted on 8/19/2020 with acute decompensated HF. Her hx includes CAD s/p CABG 2010 (LIMA to LAD, radial artery to RCA, SVG to OM) patent grafts on coronary angiogram 2017 but she underwent proximal RCA native vessel PCI for non-STEMI. Her work up her revealed severely reduced LV systolic function with LVEF 25 to 30% with severe global hypokinesia and newly diagnosed atrial fibrillation with rapid ventricular rate.  She was started on a rate control strategy and started on heparin drip.  She was set up for coronary angiogram but this had to be canceled last Friday because of acute flank pain and worsening of kidney functions.  Kidney function did recover somewhat but again worsened over the weekend.  She has been off losartan and Lasix for last few days.    1.  Newly diagnosed acute systolic congestive heart failure with severe reduced LV systolic function.  Etiology could be tachycardia mediated cardiomyopathy due to recently diagnosed atrial fibrillation with rapid ventricular rate.  Ischemic cardiomyopathy not entirely rule out.  -Continue heparin drip, aspirin, statin, beta-blocker.  Continue to hold Lasix, losartan due to fluctuating Cr.  -If Cr stable tomorrow will plan for cor angio. NPO after midnight    2.  Newly diagnosed atrial fibrillation with rapid ventricular rate.  On rate control strategy.  Ventricular rates appear well-controlled on current dose of beta-blocker.  [id number] of 5, on heparin drip.  This can be changed to oral anticoagulation post coronary angiogram.    3.  Known CAD with the CABG in 2010, coronary angiogram in 2017 showed patent LIMA to LAD, radial to RCA, SVG to OM graft.  She underwent native proximal RCA PCI at that time.  LVEF 55% echo in  2017.    4.  Peripheral vascular disease with reported history of bilateral iliac artery stenting.    6.  Acute renal failure with fluctuating Cr. Down to 1.2 today.    7. HL  -Per her last cardiology note in 8/2019 she was on atorvastatin 80 mg plus Repatha 140 mg subcutaneous every 2 weeks.    8. One 5 beat run of VT.   -Toprol XL increased to 25mg BID today due to VT and CM  -Continue with tele    Marcia Ray PA-C    Interval History   Feels ok currently. Had GRACIA PTA, but no SOB at rest. LE edema is improved with LE wraps.     Tele: afib with controlled VR.. One 5 beat run of VT this AM.    Physical Exam   Temp: 98  F (36.7  C) Temp src: Oral BP: 124/68 Pulse: 93   Resp: 16 SpO2: 93 % O2 Device: None (Room air)    Vitals:    08/22/20 0625 08/23/20 0653 08/24/20 0608   Weight: 59.9 kg (132 lb 1.6 oz) 61.2 kg (135 lb) 59.3 kg (130 lb 11.2 oz)     Vital Signs with Ranges  Temp:  [97.9  F (36.6  C)-98  F (36.7  C)] 98  F (36.7  C)  Pulse:  [87-95] 93  Resp:  [16] 16  BP: (113-124)/(68-80) 124/68  SpO2:  [93 %-96 %] 93 %  I/O last 3 completed shifts:  In: 297.3 [P.O.:200; I.V.:97.3]  Out: 450 [Urine:450]    Constitutional     alert and oriented, in no acute distress.     Lungs  clear to auscultation     Cardiac  irregular rhythm, no murmurs, no rubs    Extremities and Back     Trace LE edema observed.        Neurological     no gross motor deficits noted, affect appropriate, oriented to time, person and place.      Medications     Continuing ACE inhibitor/ARB/ARNI from home medication list OR ACE inhibitor/ARB order already placed during this visit       - MEDICATION INSTRUCTIONS -       HEParin 350 Units/hr (08/24/20 0721)     - MEDICATION INSTRUCTIONS -       - MEDICATION INSTRUCTIONS -         acetaminophen  975 mg Oral TID     aspirin  81 mg Oral Daily     atorvastatin  80 mg Oral QPM     heparin (porcine)         lidocaine (PF)         metoprolol succinate ER  25 mg Oral BID     nitroGLYcerin in D5W          sodium chloride (PF)  3 mL Intracatheter Q8H     sodium chloride (PF)  3 mL Intracatheter Q8H       Data   Most Recent 3 CBC's:  Recent Labs   Lab Test 08/22/20  0610 08/20/20  0359 08/19/20  1110   WBC 5.3 4.6 6.4   HGB 13.2 13.0 13.1   MCV 93 92 92    182 181     Most Recent 3 BMP's:  Recent Labs   Lab Test 08/24/20  0721 08/23/20  0724 08/22/20  0610    134 136   POTASSIUM 3.9 4.3 3.9   CHLORIDE 101 100 100   CO2 27 30 31   BUN 23 26 19   CR 1.27* 1.54* 1.25*   ANIONGAP 6 4 5   TORRI 8.3* 8.5 8.5   GLC 78 94 78     Most Recent 3 Troponin's:  Recent Labs   Lab Test 08/19/20  1941/20  1538 08/19/20  1110   TROPI 0.305* 0.301* 0.289*     Most Recent Cholesterol Panel:No lab results found.  Most Recent TSH and T4:No lab results found.  Most Recent Hemoglobin A1c:No lab results found.    Echocardiology:  The left ventricle is normal in size. There is normal left ventricular wall  thickness. Left ventricular systolic function is severely reduced. The visual ejection fraction is estimated at 25-30%. Diastolic function not assessed due to atrial fibrillation. There is severe global hypokinesis with minor regional variation. There is no thrombus seen in the left ventricle.  The right ventricle is normal size. Moderately decreased right ventricular  systolic function.  The left atrium is moderately dilated.  Mild to moderate tricuspid regurgitation.  No pericardial effusion.

## 2020-08-24 NOTE — PLAN OF CARE
Admitted with new CHF, bilateral lower leg edema. New Afib, started on metoprolol. She was started on IV lasix, however had an increase in her creatine, which put her angio on hold. EF 25-30%.Creat 1.27 today. Continues on Heparin gtt. Up with 1, walker and gait belt. Oriented but forgetful.  Lives at home with her daughter and daughters children. She plans to return there at discharge.

## 2020-08-25 ENCOUNTER — APPOINTMENT (OUTPATIENT)
Dept: OCCUPATIONAL THERAPY | Facility: CLINIC | Age: 80
DRG: 286 | End: 2020-08-25
Payer: COMMERCIAL

## 2020-08-25 LAB
ANION GAP SERPL CALCULATED.3IONS-SCNC: 4 MMOL/L (ref 3–14)
BUN SERPL-MCNC: 20 MG/DL (ref 7–30)
CALCIUM SERPL-MCNC: 8.6 MG/DL (ref 8.5–10.1)
CHLORIDE SERPL-SCNC: 100 MMOL/L (ref 94–109)
CO2 SERPL-SCNC: 31 MMOL/L (ref 20–32)
CREAT SERPL-MCNC: 1.13 MG/DL (ref 0.52–1.04)
ERYTHROCYTE [DISTWIDTH] IN BLOOD BY AUTOMATED COUNT: 15.1 % (ref 10–15)
GFR SERPL CREATININE-BSD FRML MDRD: 46 ML/MIN/{1.73_M2}
GLUCOSE SERPL-MCNC: 79 MG/DL (ref 70–99)
HCT VFR BLD AUTO: 39.3 % (ref 35–47)
HGB BLD-MCNC: 12.2 G/DL (ref 11.7–15.7)
KCT BLD-ACNC: 162 SEC (ref 75–150)
LMWH PPP CHRO-ACNC: 0.24 IU/ML
MCH RBC QN AUTO: 28.8 PG (ref 26.5–33)
MCHC RBC AUTO-ENTMCNC: 31 G/DL (ref 31.5–36.5)
MCV RBC AUTO: 93 FL (ref 78–100)
PLATELET # BLD AUTO: 163 10E9/L (ref 150–450)
POTASSIUM SERPL-SCNC: 4.2 MMOL/L (ref 3.4–5.3)
RBC # BLD AUTO: 4.24 10E12/L (ref 3.8–5.2)
SODIUM SERPL-SCNC: 135 MMOL/L (ref 133–144)
WBC # BLD AUTO: 6.9 10E9/L (ref 4–11)

## 2020-08-25 PROCEDURE — 80048 BASIC METABOLIC PNL TOTAL CA: CPT | Performed by: INTERNAL MEDICINE

## 2020-08-25 PROCEDURE — 93459 L HRT ART/GRFT ANGIO: CPT | Performed by: INTERNAL MEDICINE

## 2020-08-25 PROCEDURE — 25000132 ZZH RX MED GY IP 250 OP 250 PS 637: Performed by: INTERNAL MEDICINE

## 2020-08-25 PROCEDURE — 85027 COMPLETE CBC AUTOMATED: CPT | Performed by: INTERNAL MEDICINE

## 2020-08-25 PROCEDURE — 36415 COLL VENOUS BLD VENIPUNCTURE: CPT | Performed by: INTERNAL MEDICINE

## 2020-08-25 PROCEDURE — 97140 MANUAL THERAPY 1/> REGIONS: CPT | Mod: GO

## 2020-08-25 PROCEDURE — 25000132 ZZH RX MED GY IP 250 OP 250 PS 637: Performed by: PHYSICIAN ASSISTANT

## 2020-08-25 PROCEDURE — C1769 GUIDE WIRE: HCPCS | Performed by: INTERNAL MEDICINE

## 2020-08-25 PROCEDURE — 27210794 ZZH OR GENERAL SUPPLY STERILE: Performed by: INTERNAL MEDICINE

## 2020-08-25 PROCEDURE — 99207 ZZC CDG-MDM COMPONENT: MEETS MODERATE - UP CODED: CPT | Performed by: INTERNAL MEDICINE

## 2020-08-25 PROCEDURE — 99232 SBSQ HOSP IP/OBS MODERATE 35: CPT | Mod: 25 | Performed by: INTERNAL MEDICINE

## 2020-08-25 PROCEDURE — 12000000 ZZH R&B MED SURG/OB

## 2020-08-25 PROCEDURE — 85347 COAGULATION TIME ACTIVATED: CPT

## 2020-08-25 PROCEDURE — 25000128 H RX IP 250 OP 636: Performed by: INTERNAL MEDICINE

## 2020-08-25 PROCEDURE — 25800030 ZZH RX IP 258 OP 636: Performed by: INTERNAL MEDICINE

## 2020-08-25 PROCEDURE — B2111ZZ FLUOROSCOPY OF MULTIPLE CORONARY ARTERIES USING LOW OSMOLAR CONTRAST: ICD-10-PCS | Performed by: INTERNAL MEDICINE

## 2020-08-25 PROCEDURE — B2131ZZ FLUOROSCOPY OF MULTIPLE CORONARY ARTERY BYPASS GRAFTS USING LOW OSMOLAR CONTRAST: ICD-10-PCS | Performed by: INTERNAL MEDICINE

## 2020-08-25 PROCEDURE — 25000125 ZZHC RX 250: Performed by: INTERNAL MEDICINE

## 2020-08-25 PROCEDURE — 99153 MOD SED SAME PHYS/QHP EA: CPT | Performed by: INTERNAL MEDICINE

## 2020-08-25 PROCEDURE — 99152 MOD SED SAME PHYS/QHP 5/>YRS: CPT | Performed by: INTERNAL MEDICINE

## 2020-08-25 PROCEDURE — 4A023N7 MEASUREMENT OF CARDIAC SAMPLING AND PRESSURE, LEFT HEART, PERCUTANEOUS APPROACH: ICD-10-PCS | Performed by: INTERNAL MEDICINE

## 2020-08-25 PROCEDURE — 99233 SBSQ HOSP IP/OBS HIGH 50: CPT | Performed by: INTERNAL MEDICINE

## 2020-08-25 PROCEDURE — 85520 HEPARIN ASSAY: CPT | Performed by: INTERNAL MEDICINE

## 2020-08-25 PROCEDURE — B2181ZZ FLUOROSCOPY OF LEFT INTERNAL MAMMARY BYPASS GRAFT USING LOW OSMOLAR CONTRAST: ICD-10-PCS | Performed by: INTERNAL MEDICINE

## 2020-08-25 RX ORDER — METOPROLOL SUCCINATE 50 MG/1
50 TABLET, EXTENDED RELEASE ORAL 2 TIMES DAILY
Status: DISCONTINUED | OUTPATIENT
Start: 2020-08-25 | End: 2020-08-27 | Stop reason: HOSPADM

## 2020-08-25 RX ORDER — NITROGLYCERIN 5 MG/ML
VIAL (ML) INTRAVENOUS
Status: DISCONTINUED
Start: 2020-08-25 | End: 2020-08-25 | Stop reason: HOSPADM

## 2020-08-25 RX ORDER — NITROGLYCERIN 5 MG/ML
VIAL (ML) INTRAVENOUS
Status: DISCONTINUED | OUTPATIENT
Start: 2020-08-25 | End: 2020-08-25 | Stop reason: HOSPADM

## 2020-08-25 RX ORDER — LIDOCAINE HYDROCHLORIDE 10 MG/ML
INJECTION, SOLUTION EPIDURAL; INFILTRATION; INTRACAUDAL; PERINEURAL
Status: DISCONTINUED
Start: 2020-08-25 | End: 2020-08-25 | Stop reason: HOSPADM

## 2020-08-25 RX ORDER — FENTANYL CITRATE 50 UG/ML
25-50 INJECTION, SOLUTION INTRAMUSCULAR; INTRAVENOUS
Status: ACTIVE | OUTPATIENT
Start: 2020-08-25 | End: 2020-08-25

## 2020-08-25 RX ORDER — SODIUM CHLORIDE 9 MG/ML
INJECTION, SOLUTION INTRAVENOUS CONTINUOUS
Status: DISCONTINUED | OUTPATIENT
Start: 2020-08-25 | End: 2020-08-26

## 2020-08-25 RX ORDER — ASPIRIN 81 MG/1
243 TABLET ORAL DAILY
Status: DISCONTINUED | OUTPATIENT
Start: 2020-08-25 | End: 2020-08-25 | Stop reason: HOSPADM

## 2020-08-25 RX ORDER — NALOXONE HYDROCHLORIDE 0.4 MG/ML
.2-.4 INJECTION, SOLUTION INTRAMUSCULAR; INTRAVENOUS; SUBCUTANEOUS
Status: ACTIVE | OUTPATIENT
Start: 2020-08-25 | End: 2020-08-25

## 2020-08-25 RX ORDER — ATROPINE SULFATE 0.1 MG/ML
0.5 INJECTION INTRAVENOUS
Status: ACTIVE | OUTPATIENT
Start: 2020-08-25 | End: 2020-08-25

## 2020-08-25 RX ORDER — FENTANYL CITRATE 50 UG/ML
INJECTION, SOLUTION INTRAMUSCULAR; INTRAVENOUS
Status: DISCONTINUED
Start: 2020-08-25 | End: 2020-08-25 | Stop reason: HOSPADM

## 2020-08-25 RX ORDER — ACETAMINOPHEN 325 MG/1
650 TABLET ORAL EVERY 4 HOURS PRN
Status: DISCONTINUED | OUTPATIENT
Start: 2020-08-25 | End: 2020-08-27 | Stop reason: HOSPADM

## 2020-08-25 RX ORDER — SODIUM CHLORIDE 9 MG/ML
INJECTION, SOLUTION INTRAVENOUS CONTINUOUS
Status: DISCONTINUED | OUTPATIENT
Start: 2020-08-25 | End: 2020-08-25 | Stop reason: HOSPADM

## 2020-08-25 RX ORDER — POTASSIUM CHLORIDE 1500 MG/1
20 TABLET, EXTENDED RELEASE ORAL
Status: DISCONTINUED | OUTPATIENT
Start: 2020-08-25 | End: 2020-08-25 | Stop reason: HOSPADM

## 2020-08-25 RX ORDER — FLUMAZENIL 0.1 MG/ML
0.2 INJECTION, SOLUTION INTRAVENOUS
Status: ACTIVE | OUTPATIENT
Start: 2020-08-25 | End: 2020-08-25

## 2020-08-25 RX ORDER — HEPARIN SODIUM 1000 [USP'U]/ML
INJECTION, SOLUTION INTRAVENOUS; SUBCUTANEOUS
Status: DISCONTINUED
Start: 2020-08-25 | End: 2020-08-25 | Stop reason: WASHOUT

## 2020-08-25 RX ORDER — IOPAMIDOL 755 MG/ML
INJECTION, SOLUTION INTRAVASCULAR
Status: DISCONTINUED | OUTPATIENT
Start: 2020-08-25 | End: 2020-08-25 | Stop reason: HOSPADM

## 2020-08-25 RX ORDER — LIDOCAINE 40 MG/G
CREAM TOPICAL
Status: DISCONTINUED | OUTPATIENT
Start: 2020-08-25 | End: 2020-08-25 | Stop reason: HOSPADM

## 2020-08-25 RX ORDER — LORAZEPAM 0.5 MG/1
0.5 TABLET ORAL
Status: DISCONTINUED | OUTPATIENT
Start: 2020-08-25 | End: 2020-08-25 | Stop reason: HOSPADM

## 2020-08-25 RX ORDER — FENTANYL CITRATE 50 UG/ML
INJECTION, SOLUTION INTRAMUSCULAR; INTRAVENOUS
Status: DISCONTINUED | OUTPATIENT
Start: 2020-08-25 | End: 2020-08-25 | Stop reason: HOSPADM

## 2020-08-25 RX ORDER — LORAZEPAM 2 MG/ML
0.5 INJECTION INTRAMUSCULAR
Status: DISCONTINUED | OUTPATIENT
Start: 2020-08-25 | End: 2020-08-25 | Stop reason: HOSPADM

## 2020-08-25 RX ADMIN — SODIUM CHLORIDE 20 ML/HR: 9 INJECTION, SOLUTION INTRAVENOUS at 16:10

## 2020-08-25 RX ADMIN — ACETAMINOPHEN 975 MG: 325 TABLET, FILM COATED ORAL at 08:20

## 2020-08-25 RX ADMIN — METOPROLOL SUCCINATE 50 MG: 50 TABLET, EXTENDED RELEASE ORAL at 21:14

## 2020-08-25 RX ADMIN — ATORVASTATIN CALCIUM 80 MG: 40 TABLET, FILM COATED ORAL at 21:14

## 2020-08-25 RX ADMIN — ACETAMINOPHEN 975 MG: 325 TABLET, FILM COATED ORAL at 21:14

## 2020-08-25 RX ADMIN — METOPROLOL SUCCINATE 25 MG: 25 TABLET, EXTENDED RELEASE ORAL at 08:20

## 2020-08-25 RX ADMIN — ASPIRIN 81 MG: 81 TABLET, COATED ORAL at 08:20

## 2020-08-25 ASSESSMENT — ACTIVITIES OF DAILY LIVING (ADL)
ADLS_ACUITY_SCORE: 14
ADLS_ACUITY_SCORE: 15
ADLS_ACUITY_SCORE: 14
ADLS_ACUITY_SCORE: 14
ADLS_ACUITY_SCORE: 15
ADLS_ACUITY_SCORE: 14

## 2020-08-25 ASSESSMENT — MIFFLIN-ST. JEOR: SCORE: 1005.69

## 2020-08-25 NOTE — PLAN OF CARE
Heart Failure Care Map  GOALS TO BE MET BEFORE DISCHARGE:    1. Decrease congestion and/or edema with diuretic therapy to achieve near optimal volume status.     Dyspnea improved: Yes, satisfactory for discharge.   Edema improved: Yes, satisfactory for discharge.        Net I/O and Weights since admission:   07/26 0700 - 08/25 0659  In: 2618.85 [P.O.:1850; I.V.:768.85]  Out: 3900 [Urine:3500]  Net: -1281.15     Vitals:    08/19/20 1248 08/21/20 0648 08/22/20 0625 08/23/20 0653   Weight: 65.2 kg (143 lb 11.8 oz) 61 kg (134 lb 6.4 oz) 59.9 kg (132 lb 1.6 oz) 61.2 kg (135 lb)    08/24/20 0608   Weight: 59.3 kg (130 lb 11.2 oz)       2.  O2 sats > 92% on room air, or at prior home O2 therapy level.      Able to wean O2 this shift to keep sats above 92%?: Yes, satisfactory for discharge.   Does patient use Home O2? No          Current oxygenation status:   SpO2: 95 %     O2 Device: None (Room air),      3.  Tolerates ambulation and mobility near baseline.     Ambulation: Yes, satisfactory for discharge.   Times patient ambulated with staff this shift: 4    Please review the Heart Failure Care Map for additional HF goal outcomes.    Nadya Ramirez RN  8/25/2020     Code status: FULL   COVID-19 result: Negative     Pertinent assessment: A&Ox4; forgetful at times. VSS. Tele: Afib CVR. LS: Clear, diminished. GI: c/o of abdominal discomfort; last BM 8/21. Miralax given on previous shift with no results. Pt had an episode of emesis; PRN zofran given w/ relief. : voiding without difficulty. Pt c/o 6/10 flank pain; PRN dilaudid given w/ relief. Skin: intact. Heparin gtt infusing @ 3.5mL/hr. Up Ax1 w/ GB and walker. Low fat, low Na diet. K+: 3.9; 20 mEq given- recheck scheduled in AM.     Treatment plan: Possible angio today. Continue with hep gtt and ASA. OT and cardiology following. See notes. Continue with POC.   Discharge dispo: 1-2 days

## 2020-08-25 NOTE — PLAN OF CARE
Neuro: Oriented but forgetful. Cooperative. C/o occasional L flank pain (improved from yesterday)  CV: A-fib w/ CVR (rate 80s-90s), Occasional PVCs. Metprolol XR increased to 50mg BID today. Trace edema in BLEs.  Resp: Lung sounds diminished in bases. Frequent dry cough w/ small amt sputum (chronic per pt). Some dyspnea w/ exertion. Easily fatigued.  GI: No BM  today. PRN miralax not given today dt pt going to procedure. Pt NPO for procedure, but reports ongoing poor appetite. LBM 8/21.  : Voids.  Skin: WNL  IV: R PIV in place.  Labs: K 4.2, Cr 1.13 (improving)    Shift summary: VSS. Creatinine continues to improve. Edema well-managed. Pt switched to spandi- today (NOC shift to remove at night). Pt taken to angiogram around 1245 and returned to floor around 1500. Heparin gtt DCed. Angio negative for blockage. Suspect heart failure dt new a-fib. Plan for anticoag tx and cardioversion outpatient. Flank pain improved today.    Unable to palpate posterior R tibial pulse s/p angio. Pedal pulses in place. Site intact. Cath lab notified.

## 2020-08-25 NOTE — PROGRESS NOTES
[facility]    Cardiology Progress Note    Date of Service (when I saw the patient): 08/25/2020     Primary cardiologist: Dr Johnson with [facility]    Assessment & Plan   Noelle Mullins is a 79 year old female who was admitted on 8/19/2020 with acute decompensated HF. Her hx includes CAD s/p CABG 2010 (LIMA to LAD, radial artery to RCA, SVG to OM) patent grafts on coronary angiogram 2017 but she underwent proximal RCA native vessel PCI for non-STEMI. Her work up her revealed severely reduced LV systolic function with LVEF 25 to 30% with severe global hypokinesia and newly diagnosed atrial fibrillation with rapid ventricular rate.  She was started on a rate control strategy and started on heparin drip.  She was set up for coronary angiogram but this had to be canceled last Friday because of acute flank pain and worsening of kidney functions.  Kidney function did recover somewhat but again worsened over the weekend.  She has been off losartan and Lasix for last few days.    1.  Newly diagnosed acute systolic congestive heart failure with severe reduced LV systolic function.  Etiology could be tachycardia mediated cardiomyopathy due to recently diagnosed atrial fibrillation with rapid ventricular rate.  Ischemic cardiomyopathy not entirely rule out.  -Continue heparin drip, aspirin, statin, beta-blocker.  Continue to hold Lasix, losartan due to fluctuating Cr.  -Cr stable, plan for cor angio today. Consent on the chart, cath lab notified.    2.  Newly diagnosed atrial fibrillation with rapid ventricular rate.  On rate control strategy.  Ventricular rates appear well-controlled on current dose of beta-blocker.  [id number] of 5, on heparin drip.  This can be changed to oral anticoagulation post coronary angiogram.    3.  Known CAD with the CABG in 2010, coronary angiogram in 2017 showed patent LIMA to LAD, radial to RCA, SVG to OM graft.  She underwent native proximal RCA PCI at that time.  LVEF 55% echo in  2017.    4.  Peripheral vascular disease with reported history of bilateral iliac artery stenting.    6.  Acute renal failure with fluctuating Cr. Down to 1.2 today.    7. HL  -Per her last cardiology note in 8/2019 she was on atorvastatin 80 mg plus Repatha 140 mg subcutaneous every 2 weeks.    8. One 5 beat run of VT 8/24. No further episodes noted.   -Toprol XL increased to 25mg BID yesterday due to VT and CM. Will continue with titration and increase to 50mg BID today  -Continue with tele    Marcia Ray PA-C      Interval History   Feels ok currently-has a bit of L flank pain. Had GRACIA PTA, but no SOB at rest. LE edema has improved with LE wraps.     Tele: afib with controlled VR.    Physical Exam   Temp: 98.1  F (36.7  C) Temp src: Oral BP: 131/75 Pulse: 82   Resp: 18 SpO2: 94 % O2 Device: None (Room air)    Vitals:    08/23/20 0653 08/24/20 0608 08/25/20 0528   Weight: 61.2 kg (135 lb) 59.3 kg (130 lb 11.2 oz) 60.4 kg (133 lb 3.2 oz)     Vital Signs with Ranges  Temp:  [95.6  F (35.3  C)-98.2  F (36.8  C)] 98.1  F (36.7  C)  Pulse:  [82-91] 82  Resp:  [16-18] 18  BP: (110-156)/(69-88) 131/75  SpO2:  [92 %-96 %] 94 %  I/O last 3 completed shifts:  In: 640 [P.O.:640]  Out: 975 [Urine:575; Emesis/NG output:400]    Constitutional     alert and oriented, in no acute distress.     Lungs  clear to auscultation     Cardiac  irregular rhythm, no murmurs, no rubs    Extremities and Back     Trace LE edema observed.        Neurological     no gross motor deficits noted, affect appropriate, oriented to time, person and place.      Medications     Continuing ACE inhibitor/ARB/ARNI from home medication list OR ACE inhibitor/ARB order already placed during this visit       - MEDICATION INSTRUCTIONS -       HEParin 350 Units/hr (08/25/20 0744)     - MEDICATION INSTRUCTIONS -       - MEDICATION INSTRUCTIONS -         acetaminophen  975 mg Oral TID     aspirin  81 mg Oral Daily     atorvastatin  80 mg Oral QPM      heparin (porcine)         lidocaine (PF)         metoprolol succinate ER  25 mg Oral BID     nitroGLYcerin in D5W         sodium chloride (PF)  3 mL Intracatheter Q8H     sodium chloride (PF)  3 mL Intracatheter Q8H       Data   Most Recent 3 CBC's:  Recent Labs   Lab Test 08/25/20  0700 08/22/20  0610 08/20/20  0359   WBC 6.9 5.3 4.6   HGB 12.2 13.2 13.0   MCV 93 93 92    190 182     Most Recent 3 BMP's:  Recent Labs   Lab Test 08/25/20  0700 08/24/20  0721 08/23/20  0724    134 134   POTASSIUM 4.2 3.9 4.3   CHLORIDE 100 101 100   CO2 31 27 30   BUN 20 23 26   CR 1.13* 1.27* 1.54*   ANIONGAP 4 6 4   TORRI 8.6 8.3* 8.5   GLC 79 78 94     Most Recent 3 Troponin's:  Recent Labs   Lab Test 08/19/20  1941/20  1538 08/19/20  1110   TROPI 0.305* 0.301* 0.289*     Most Recent Cholesterol Panel:No lab results found.  Most Recent TSH and T4:No lab results found.  Most Recent Hemoglobin A1c:No lab results found.    Echocardiology:  The left ventricle is normal in size. There is normal left ventricular wall  thickness. Left ventricular systolic function is severely reduced. The visual ejection fraction is estimated at 25-30%. Diastolic function not assessed due to atrial fibrillation. There is severe global hypokinesis with minor regional variation. There is no thrombus seen in the left ventricle.  The right ventricle is normal size. Moderately decreased right ventricular  systolic function.  The left atrium is moderately dilated.  Mild to moderate tricuspid regurgitation.  No pericardial effusion.

## 2020-08-25 NOTE — PRE-PROCEDURE
GENERAL PRE-PROCEDURE:   Procedure:  Left heart cath poss intervention    Written consent obtained?: Yes    Risks and benefits: Risks, benefits and alternatives were discussed    Consent given by:  Patient  Patient states understanding of procedure being performed: Yes    Patient's understanding of procedure matches consent: Yes    Procedure consent matches procedure scheduled: Yes    Appropriately NPO:  Yes  Mallampati  :  Grade 2- soft palate, base of uvula, tonsillar pillars, and portion of posterior pharyngeal wall visible  Lungs:  Lungs clear with good breath sounds bilaterally  Heart:  Normal heart sounds and rate  History & Physical reviewed:  History and physical reviewed and no updates needed  Statement of review:  I have reviewed the lab findings, diagnostic data, medications, and the plan for sedation

## 2020-08-25 NOTE — PROGRESS NOTES
[facility]    Hospitalist Progress Note      Assessment & Plan   Noelle Mullins is a 79 year old female who was admitted on 8/19/2020.    Summary of Stay:   Noelle Mullins is a very pleasant 79 y.o. [place] woman who speaks good English and has PMH including NSTEMI (stenting of a high-grade stenosis in the proximal right coronary artery in 2017), CABG (2010), SVT, peripheral arterial disease with bilateral iliac stents.     She presented 8/19/20 presents w/ 3+ LE edema and dyspnea found to have a-fib w/ RVR, bnp elevated at 10,189 and troponin elevation of 0.289 w/ CXR showing mild to moderate left pleural effusion and mild pulmonary vascular congestion.       She was started on IV diuresis with Lasix.  Heart rate was controlled with oral metoprolol.  Started on IV heparin infusion.  Cardiology consulted.  The rate is adequately controlled with oral metoprolol.      She was started on IV Lasix 40 mg twice daily.  The Lasix was held after rise in the creatinine.     The plan was to do the cardiac cath on Friday but due to the rise in the creatinine it was postponed.     Her breathing is stable.  Denies any orthopnea.  Some shortness of breath with activity but significantly improved. Remains off of lasix.     Also complained of left flank pain it is paraspinal area which he started experiencing on Friday morning.  No urinary symptoms.  Urinalysis is unremarkable. Appears musculoskeletal.    Plan:    Addendum:  S/p cardiac cath. Findings suggestive of low likelihood of cardiomyopathy due to ischemic heart disease. Cardiology planning KALPESH cardioversion tomorrow.    Heart failure with reduced ejection fraction, with acute decompensation.  - Ejection fraction 25 to 30% which is new.  Severe global hypokinesis.  Moderately decreased RV systolic function.  - Likely related to tachycardia with A. fib.  But given her coronary artery disease history, ischemic cause needed to be ruled  out.  -  Initially diuresed with IV Lasix which was held on Friday due to rise in the creatinine.  -Continue metoprolol.  On atorvastatin 80 mg.  - IV heparin, aspirin.  - Plan for cardiac cath per cardiology, today     Atrial fibrillation.  -The rate is controlled with oral metoprolol.  -On IV heparin infusion.     Acute kidney injury.    - Creatinine was 1.18 at admission, -> 1.34 -> 1.44 -> 1.25 -> 1.54 -> 1.27 ->1.13  - Nonoliguric.  - Holding the losartan and Lasix for now.  - renal ultrasound: unremarkable  - Normal urinalysis with no protein or RBCs.     Left flank pain.  -stable, tender spot of left paraspinal muscle.   - No hematuria grossly, no other urinary symptoms.  - UA: normal, no hematuria or pyuria  - renal ultrasound: unremarkable  - likely musculoskeletal in nature  -Earlier she told me that the pain started on Friday, but today she tells me that the pain has been going on for a couple of weeks, present at home.     Bilateral lower extremity edema  -Lymphedema wraps in place.     Peripheral arterial disease.  -Continue aspirin.  Plavix is being held.  On IV heparin.     Coronary artery disease  - Overall stable.  Medication management as above.      DVT Prophylaxis: IV heparin  Code Status: Full Code  Expected discharge: possibly tomorrow    Luis Goldman MD  Text Page (7am - 6pm, M-F)    Interval History   Patient was evaluated with nursing staff. Overnight issues discussed.    Review of systems:  No nausea or vomiting.  No abdominal pain.  No diarrhea.  No chest pain/palpitations.  No new cough/shortness of breath.  No headache/visual disturbance/new weakness.    -Data reviewed today: Labs and medications.    Physical Exam   Temp: 98  F (36.7  C) Temp src: Oral BP: 121/63 Pulse: 86   Resp: 18 SpO2: 93 % O2 Device: None (Room air)    Vitals:    08/23/20 0653 08/24/20 0608 08/25/20 0528   Weight: 61.2 kg (135 lb) 59.3 kg (130 lb 11.2 oz) 60.4 kg (133 lb 3.2 oz)     Vital Signs with Ranges  Temp:   [95.6  F (35.3  C)-98.2  F (36.8  C)] 98  F (36.7  C)  Pulse:  [82-91] 86  Resp:  [16-18] 18  BP: (118-156)/(63-88) 121/63  SpO2:  [92 %-96 %] 93 %  I/O last 3 completed shifts:  In: 640 [P.O.:640]  Out: 975 [Urine:575; Emesis/NG output:400]    Constitutional: Awake, alert, cooperative, no apparent distress  HEENT: Trachea midline, sclera is clear   Respiratory: No crackles. No wheezing. Equal breath sounds bilaterally.  Cardiovascular: Regular rate and rhythm, normal S1 and S2, and no murmur noted  GI: Normal bowel sounds, soft, non-distended, non-tender    Medications     Continuing ACE inhibitor/ARB/ARNI from home medication list OR ACE inhibitor/ARB order already placed during this visit       - MEDICATION INSTRUCTIONS -       HEParin 350 Units/hr (08/25/20 0829)     - MEDICATION INSTRUCTIONS -       - MEDICATION INSTRUCTIONS -       - MEDICATION INSTRUCTIONS -       sodium chloride         acetaminophen  975 mg Oral TID     aspirin  243 mg Oral Daily     aspirin  81 mg Oral Daily     atorvastatin  80 mg Oral QPM     heparin (porcine)         heparin (porcine)         lidocaine (PF)         lidocaine (PF)         metoprolol succinate ER  50 mg Oral BID     nitroGLYcerin in D5W         nitroGLYcerin in D5W         sodium chloride (PF)  3 mL Intracatheter Q8H     sodium chloride (PF)  3 mL Intracatheter Q8H     sodium chloride (PF)  3 mL Intracatheter Q8H       Data   Recent Labs   Lab 08/25/20  0700 08/24/20  0721 08/23/20  0724 08/22/20  0610  08/20/20  0359 08/19/20  1941/20  1538 08/19/20  1110   WBC 6.9  --   --  5.3  --  4.6  --   --  6.4   HGB 12.2  --   --  13.2  --  13.0  --   --  13.1   MCV 93  --   --  93  --  92  --   --  92     --   --  190  --  182  --   --  181    134 134 136   < > 139  --   --  138   POTASSIUM 4.2 3.9 4.3 3.9   < > 3.3*  --   --  4.2   CHLORIDE 100 101 100 100   < > 101  --   --  105   CO2 31 27 30 31   < > 28  --   --  22   BUN 20 23 26 19   < > 18  --   --  17    CR 1.13* 1.27* 1.54* 1.25*   < > 1.34*  --   --  1.18*   ANIONGAP 4 6 4 5   < > 10  --   --  11   TORRI 8.6 8.3* 8.5 8.5   < > 8.6  --   --  9.0   GLC 79 78 94 78   < > 84  --   --  93   ALBUMIN  --   --   --   --   --  3.5  --   --   --    PROTTOTAL  --   --   --   --   --  7.0  --   --   --    BILITOTAL  --   --   --   --   --  3.3*  --   --   --    ALKPHOS  --   --   --   --   --  79  --   --   --    ALT  --   --   --   --   --  20  --   --   --    AST  --   --   --   --   --  29  --   --   --    TROPI  --   --   --   --   --   --  0.305* 0.301* 0.289*    < > = values in this interval not displayed.       No results found for this or any previous visit (from the past 24 hour(s)).

## 2020-08-25 NOTE — PROVIDER NOTIFICATION
"MD paged: \"FYI pt vomited 400cc. stating feeling dizzy and lightheaded. VSS. PRN IV Zofran given.\"   "

## 2020-08-25 NOTE — PROVIDER NOTIFICATION
Pt on Heparin post angio for KALPESH/DCCV tomorrow. Now with epistaxis. Orders?  MD returned phone call with no new orders at this time unless bleeding continues.  7:00 paged admitting. 305 nose bleed. Stop heparin 4-5 hours and continue to monitor.

## 2020-08-25 NOTE — PLAN OF CARE
"Discharge Planner OT   Patient plan for discharge: Home w/ family support  Current status: Pt continues to have noted improvement in BLE edema. Pt agreeable to trial gentle spandi  sleeve in place of gradient compression bandages as no increase in edema observed after leaving wraps off, however pt reporting \"they still feel tight\". Spandi  sleeves donned on BLEs. Ongoing encouragement provided for continue LE edema exercises, including ankle pumps/circles. Pt declines ambulation at this time due to fatigue and slight nausea. Hopeful to have angio today.     Nursing -please remove BLE spandi  stockings at night; re-fariba in AM.    Barriers to return to prior living situation: Stairs, decreased functional activity tolerance/strength  Recommendations for discharge: Home w/ family assist for bathing, IADLs (homemaking, laundry, meal prep, etc) and HH PT/OT, HH lymphedema  Rationale for recommendations: Pt is below baseline level of functioning with regards to ADLs/IADLs and mobility tasks, limited by fatigue. Recommend ongoing skilled OT while IP and in HH setting to improve strength, functional activity tolerance and safety needed for daily tasks. HH services indicated as leaving the home environment would require significant effort at this time.   "

## 2020-08-25 NOTE — PLAN OF CARE
Patient admitted with SOB and bilateral leg swelling. New A fib, new CHF. She was given metoprolol and IV lasix. Creatinine increased so Angio was put out until it was corrected. Rt groin Angio today without intervention. Heparin restarted and began having some epistaxis. MD notified.  Watch for now since it has stopped. Plan KALPESH and cardioversion tomorrow. Left flank pain off and on. Pain medication and heat given without relief. Discharge to home with daughter when able.

## 2020-08-26 ENCOUNTER — APPOINTMENT (OUTPATIENT)
Dept: CARDIOLOGY | Facility: CLINIC | Age: 80
DRG: 286 | End: 2020-08-26
Attending: INTERNAL MEDICINE
Payer: COMMERCIAL

## 2020-08-26 ENCOUNTER — APPOINTMENT (OUTPATIENT)
Dept: OCCUPATIONAL THERAPY | Facility: CLINIC | Age: 80
DRG: 286 | End: 2020-08-26
Payer: COMMERCIAL

## 2020-08-26 LAB
ANION GAP SERPL CALCULATED.3IONS-SCNC: 5 MMOL/L (ref 3–14)
BUN SERPL-MCNC: 15 MG/DL (ref 7–30)
CALCIUM SERPL-MCNC: 8.3 MG/DL (ref 8.5–10.1)
CHLORIDE SERPL-SCNC: 105 MMOL/L (ref 94–109)
CO2 SERPL-SCNC: 27 MMOL/L (ref 20–32)
CREAT SERPL-MCNC: 1.04 MG/DL (ref 0.52–1.04)
ERYTHROCYTE [DISTWIDTH] IN BLOOD BY AUTOMATED COUNT: 15.2 % (ref 10–15)
GFR SERPL CREATININE-BSD FRML MDRD: 51 ML/MIN/{1.73_M2}
GLUCOSE SERPL-MCNC: 77 MG/DL (ref 70–99)
HCT VFR BLD AUTO: 37.9 % (ref 35–47)
HGB BLD-MCNC: 11.7 G/DL (ref 11.7–15.7)
INR PPP: 1.12 (ref 0.86–1.14)
LMWH PPP CHRO-ACNC: 0.16 IU/ML
MAGNESIUM SERPL-MCNC: 2.3 MG/DL (ref 1.6–2.3)
MCH RBC QN AUTO: 28.6 PG (ref 26.5–33)
MCHC RBC AUTO-ENTMCNC: 30.9 G/DL (ref 31.5–36.5)
MCV RBC AUTO: 93 FL (ref 78–100)
PLATELET # BLD AUTO: 166 10E9/L (ref 150–450)
POTASSIUM SERPL-SCNC: 4.2 MMOL/L (ref 3.4–5.3)
RBC # BLD AUTO: 4.09 10E12/L (ref 3.8–5.2)
SODIUM SERPL-SCNC: 137 MMOL/L (ref 133–144)
WBC # BLD AUTO: 5.3 10E9/L (ref 4–11)

## 2020-08-26 PROCEDURE — 99232 SBSQ HOSP IP/OBS MODERATE 35: CPT | Mod: 25 | Performed by: INTERNAL MEDICINE

## 2020-08-26 PROCEDURE — 99232 SBSQ HOSP IP/OBS MODERATE 35: CPT | Performed by: INTERNAL MEDICINE

## 2020-08-26 PROCEDURE — 25000125 ZZHC RX 250

## 2020-08-26 PROCEDURE — 25000132 ZZH RX MED GY IP 250 OP 250 PS 637: Performed by: INTERNAL MEDICINE

## 2020-08-26 PROCEDURE — 25000132 ZZH RX MED GY IP 250 OP 250 PS 637: Performed by: PHYSICIAN ASSISTANT

## 2020-08-26 PROCEDURE — 85610 PROTHROMBIN TIME: CPT | Performed by: INTERNAL MEDICINE

## 2020-08-26 PROCEDURE — 83735 ASSAY OF MAGNESIUM: CPT | Performed by: INTERNAL MEDICINE

## 2020-08-26 PROCEDURE — 36415 COLL VENOUS BLD VENIPUNCTURE: CPT | Performed by: INTERNAL MEDICINE

## 2020-08-26 PROCEDURE — 12000000 ZZH R&B MED SURG/OB

## 2020-08-26 PROCEDURE — 85027 COMPLETE CBC AUTOMATED: CPT | Performed by: INTERNAL MEDICINE

## 2020-08-26 PROCEDURE — 93325 DOPPLER ECHO COLOR FLOW MAPG: CPT

## 2020-08-26 PROCEDURE — 25000128 H RX IP 250 OP 636: Performed by: INTERNAL MEDICINE

## 2020-08-26 PROCEDURE — 99152 MOD SED SAME PHYS/QHP 5/>YRS: CPT | Mod: 59 | Performed by: INTERNAL MEDICINE

## 2020-08-26 PROCEDURE — 97530 THERAPEUTIC ACTIVITIES: CPT | Mod: GO

## 2020-08-26 PROCEDURE — 93312 ECHO TRANSESOPHAGEAL: CPT | Mod: 26 | Performed by: INTERNAL MEDICINE

## 2020-08-26 PROCEDURE — 85520 HEPARIN ASSAY: CPT | Performed by: INTERNAL MEDICINE

## 2020-08-26 PROCEDURE — 93325 DOPPLER ECHO COLOR FLOW MAPG: CPT | Mod: 26 | Performed by: INTERNAL MEDICINE

## 2020-08-26 PROCEDURE — 93320 DOPPLER ECHO COMPLETE: CPT | Mod: 26 | Performed by: INTERNAL MEDICINE

## 2020-08-26 PROCEDURE — 80048 BASIC METABOLIC PNL TOTAL CA: CPT | Performed by: INTERNAL MEDICINE

## 2020-08-26 RX ORDER — GLYCOPYRROLATE 0.2 MG/ML
INJECTION INTRAMUSCULAR; INTRAVENOUS
Status: DISPENSED
Start: 2020-08-26 | End: 2020-08-26

## 2020-08-26 RX ORDER — FENTANYL CITRATE 50 UG/ML
25 INJECTION, SOLUTION INTRAMUSCULAR; INTRAVENOUS
Status: DISCONTINUED | OUTPATIENT
Start: 2020-08-26 | End: 2020-08-27 | Stop reason: HOSPADM

## 2020-08-26 RX ORDER — GLYCOPYRROLATE 0.2 MG/ML
0.1 INJECTION, SOLUTION INTRAMUSCULAR; INTRAVENOUS ONCE
Status: COMPLETED | OUTPATIENT
Start: 2020-08-26 | End: 2020-08-26

## 2020-08-26 RX ORDER — POTASSIUM CHLORIDE 1500 MG/1
40 TABLET, EXTENDED RELEASE ORAL
Status: DISCONTINUED | OUTPATIENT
Start: 2020-08-26 | End: 2020-08-27 | Stop reason: HOSPADM

## 2020-08-26 RX ORDER — FLUMAZENIL 0.1 MG/ML
0.2 INJECTION, SOLUTION INTRAVENOUS
Status: DISCONTINUED | OUTPATIENT
Start: 2020-08-26 | End: 2020-08-26

## 2020-08-26 RX ORDER — LIDOCAINE HYDROCHLORIDE 40 MG/ML
1.5 SOLUTION TOPICAL ONCE
Status: DISCONTINUED | OUTPATIENT
Start: 2020-08-26 | End: 2020-08-27 | Stop reason: HOSPADM

## 2020-08-26 RX ORDER — LIDOCAINE HYDROCHLORIDE 20 MG/ML
SOLUTION OROPHARYNGEAL
Status: COMPLETED
Start: 2020-08-26 | End: 2020-08-26

## 2020-08-26 RX ORDER — FENTANYL CITRATE 50 UG/ML
INJECTION, SOLUTION INTRAMUSCULAR; INTRAVENOUS
Status: DISPENSED
Start: 2020-08-26 | End: 2020-08-26

## 2020-08-26 RX ORDER — DEXTROSE MONOHYDRATE 25 G/50ML
9.5 INJECTION, SOLUTION INTRAVENOUS
Status: DISCONTINUED | OUTPATIENT
Start: 2020-08-26 | End: 2020-08-26

## 2020-08-26 RX ORDER — LIDOCAINE 40 MG/G
CREAM TOPICAL
Status: DISCONTINUED | OUTPATIENT
Start: 2020-08-26 | End: 2020-08-27 | Stop reason: HOSPADM

## 2020-08-26 RX ORDER — LIDOCAINE 50 MG/G
OINTMENT TOPICAL ONCE
Status: DISCONTINUED | OUTPATIENT
Start: 2020-08-26 | End: 2020-08-27 | Stop reason: HOSPADM

## 2020-08-26 RX ORDER — FUROSEMIDE 20 MG
20 TABLET ORAL DAILY
Status: DISCONTINUED | OUTPATIENT
Start: 2020-08-26 | End: 2020-08-27 | Stop reason: HOSPADM

## 2020-08-26 RX ORDER — POTASSIUM CHLORIDE 1500 MG/1
20 TABLET, EXTENDED RELEASE ORAL
Status: DISCONTINUED | OUTPATIENT
Start: 2020-08-26 | End: 2020-08-27 | Stop reason: HOSPADM

## 2020-08-26 RX ORDER — MAGNESIUM SULFATE HEPTAHYDRATE 40 MG/ML
2 INJECTION, SOLUTION INTRAVENOUS
Status: DISCONTINUED | OUTPATIENT
Start: 2020-08-26 | End: 2020-08-27 | Stop reason: HOSPADM

## 2020-08-26 RX ORDER — NALOXONE HYDROCHLORIDE 0.4 MG/ML
.1-.4 INJECTION, SOLUTION INTRAMUSCULAR; INTRAVENOUS; SUBCUTANEOUS
Status: DISCONTINUED | OUTPATIENT
Start: 2020-08-26 | End: 2020-08-27 | Stop reason: HOSPADM

## 2020-08-26 RX ORDER — SODIUM CHLORIDE 9 MG/ML
INJECTION, SOLUTION INTRAVENOUS CONTINUOUS PRN
Status: DISCONTINUED | OUTPATIENT
Start: 2020-08-26 | End: 2020-08-27 | Stop reason: HOSPADM

## 2020-08-26 RX ADMIN — ASPIRIN 81 MG: 81 TABLET, COATED ORAL at 08:05

## 2020-08-26 RX ADMIN — GLYCOPYRROLATE 0.1 MG: 0.2 INJECTION, SOLUTION INTRAMUSCULAR; INTRAVENOUS at 11:13

## 2020-08-26 RX ADMIN — METOPROLOL SUCCINATE 50 MG: 50 TABLET, EXTENDED RELEASE ORAL at 08:05

## 2020-08-26 RX ADMIN — MIDAZOLAM 1 MG: 1 INJECTION INTRAMUSCULAR; INTRAVENOUS at 11:30

## 2020-08-26 RX ADMIN — METOPROLOL SUCCINATE 50 MG: 50 TABLET, EXTENDED RELEASE ORAL at 20:59

## 2020-08-26 RX ADMIN — APIXABAN 5 MG: 5 TABLET, FILM COATED ORAL at 19:35

## 2020-08-26 RX ADMIN — ACETAMINOPHEN 975 MG: 325 TABLET, FILM COATED ORAL at 08:05

## 2020-08-26 RX ADMIN — ACETAMINOPHEN 975 MG: 325 TABLET, FILM COATED ORAL at 20:59

## 2020-08-26 RX ADMIN — POLYETHYLENE GLYCOL 3350 17 G: 17 POWDER, FOR SOLUTION ORAL at 07:52

## 2020-08-26 RX ADMIN — DOCUSATE SODIUM AND SENNOSIDES 2 TABLET: 8.6; 5 TABLET ORAL at 07:53

## 2020-08-26 RX ADMIN — LIDOCAINE HYDROCHLORIDE 30 ML: 20 SOLUTION ORAL; TOPICAL at 11:11

## 2020-08-26 RX ADMIN — FENTANYL CITRATE 25 MCG: 50 INJECTION, SOLUTION INTRAMUSCULAR; INTRAVENOUS at 11:34

## 2020-08-26 RX ADMIN — ACETAMINOPHEN 650 MG: 325 TABLET, FILM COATED ORAL at 16:00

## 2020-08-26 RX ADMIN — FUROSEMIDE 20 MG: 20 TABLET ORAL at 13:20

## 2020-08-26 RX ADMIN — ATORVASTATIN CALCIUM 80 MG: 40 TABLET, FILM COATED ORAL at 19:35

## 2020-08-26 ASSESSMENT — ACTIVITIES OF DAILY LIVING (ADL)
ADLS_ACUITY_SCORE: 14
ADLS_ACUITY_SCORE: 15
ADLS_ACUITY_SCORE: 14
ADLS_ACUITY_SCORE: 14

## 2020-08-26 ASSESSMENT — MIFFLIN-ST. JEOR: SCORE: 1007.51

## 2020-08-26 NOTE — PROGRESS NOTES
SPIRITUAL HEALTH SERVICES Progress Note  [facility] Med Surg 3    Spiritual Health follow up visit. Ronnie is seated in bedside chair and reflected on her home in [facility].  Although she has lived in the [place] many [place], [place]  She keeps connected to them by her IPad and shared that they have all been praying for her.  Today, Ronnie, is awaiting further testing and more information about her plan of care.  She welcomed a time of prayer for healing and comfort.      Plan: Spiritual Health Services remains available for additional emotional/spiritual support.    Agustin Carrizales MA  Staff   Pager: [phone]  Phone: [phone]

## 2020-08-26 NOTE — PLAN OF CARE
End of Shift Note:  For VS and complete assessments, please see documentation flowsheets.    COVID status: NEGATIVE     Pertinent shift assessments: A&Ox4. C/o some left sided flank/back pain relieved w/ scheduled tylenol. LS clear. Tele Afib CVR. No CP or SOB reported. Right groin site WDL. CMS intact. No further nosebleeds this shift. NPO after 0000.    Treatment Plan: Cardioversion today     Expected Discharge Date/Disposition: Possible today

## 2020-08-26 NOTE — PROGRESS NOTES
[facility]  Informed daughter that [facility] received a referral for home care. Care Coordinator note indicated that pt and daughter were interested in services through  Home Care. Daughter stated would be best to stay within the [facility]/ system as that is where all of their follow up will be through. Informed would notify care transition team of desire to stay within [facility]/. Also informed daughter that there may be that possibility that they are unable to accept the referral and may come back to , verbalized understanding.   Spoke with KORI Brizuela to make her aware of need for referral to go to [facility].  After further chart review, noted the following...  Update 1120: Received call back from [facility], they report that pt resides outside of their service area so they will not be able to open her to services. Will discuss further with pt.  [facility] will proceed with the referral.

## 2020-08-26 NOTE — PHARMACY-RX INSURANCE COVERAGE
Anticoagulation coverage check.  Patient has Medicare D through [facility] [place] [facility].    Xarelto/Eliquis:  $3.90/mo.      Jantoven (warfarin): $1.30/mo      -JOHN Vann, Pharmacy Technician/Liaison, Discharge Pharmacy, [phone]

## 2020-08-26 NOTE — PROGRESS NOTES
"CLINICAL NUTRITION SERVICES  -  ASSESSMENT NOTE    Recommendations Ordered by Registered Dietitian (RD):   Diet per MD/cardiology   Ordered Boost Plus BID   Malnutrition:   % Weight Loss: unable to determine --> limited weight hx  % Intake:  <75% for > 7 days (non-severe malnutrition) - varying diet order with procedures  Subcutaneous Fat Loss:  None observed  Muscle Loss:  Temporal region mild depletion --> will not use given only one indicator   Fluid Retention: trace     Malnutrition Diagnosis: Unable to determine due to lack of weight history      REASON FOR ASSESSMENT  Noelle Mullins is a 79 year old female seen by Registered Dietitian for LOS and Provider Order - Nutrition Education - \"Heart failure - dietitian to instruct patient on 2 gram sodium diet\"     NUTRITION HISTORY  - Information obtained from patient and chart, pt has waived     - Pt admitted for acute CHF, likely diastolic due to a fib with RVR  - History of CAD s/p CABG in 2011, pSVT, GERD, PVD s/p stent to R common iliac artery, HTN, HLP  - Typical food/fluid intake PTA: pt typically consumes 2-3 meals per day. She states that she has been a very picky eater from an early age.   - Breakfast: tea  - Lunch: anything that is available in the house. Fish and vegetables is a good example   - Dinner: if she consumes a larger lunch then she won't eat but if she is hungry she will have a sandwich.  - Supplements: Ensure x 2   - Cooks/prepares meals: patient cooks meals, do not go out to eat much   - Chewing/swallowing difficulty: none   - Food allergies: NKFA    CURRENT NUTRITION ORDERS  Diet Order:     NPO - for procedure     8/19-8/21: 2 g Na + low saturated fat + no caffeine   8/21: NPO   8/21-8/25: low saturated fat <2400 mg   8/25: NPO  8/25: clear liquid   8/25-8/26: regular diet + 3 g Na   8/26-current: NPO     Current Intake/Tolerance:  Upon review of the flowsheets, pt is consuming 0-100% of meals ordered. Diet order affected by " "cardioversion which was withheld for a couple of days given kidney function. Also NPO for cardioversion procedure.     NUTRITION FOCUSED PHYSICAL ASSESSMENT FOR DIAGNOSING MALNUTRITION)  Yes             Observed:    Muscle wasting (refer to documentation in Malnutrition section)    Obtained from Chart/Interdisciplinary Team:  - cardiology following   - pt underwent angio on 8/25 and KALPESH on 8/26    ANTHROPOMETRICS  Height: 5' 0\"  Weight: 60.6 kg ( 133 lbs 9.6 oz)   Body mass index is 26.09 kg/m .  Weight Status:  Overweight BMI 25-29.9  Weight History: limited weight history to comment on. Pt states that she is around 140 lbs at baseline. Unsure of timeline of weight loss.   Wt Readings from Last 10 Encounters:   08/26/20 60.6 kg (133 lb 9.6 oz)     LABS  Labs reviewed    Electrolytes  Potassium (mmol/L)   Date Value   08/26/2020 4.2   08/25/2020 4.2   08/24/2020 3.9    Blood Glucose  Glucose (mg/dL)   Date Value   08/26/2020 77   08/25/2020 79   08/24/2020 78   08/23/2020 94   08/22/2020 78    Inflammatory Markers  WBC (10e9/L)   Date Value   08/26/2020 5.3   08/25/2020 6.9   08/22/2020 5.3     Albumin (g/dL)   Date Value   08/20/2020 3.5      Magnesium (mg/dL)   Date Value   08/26/2020 2.3   08/24/2020 2.2   08/21/2020 2.6 (H)     Sodium (mmol/L)   Date Value   08/26/2020 137   08/25/2020 135   08/24/2020 134    Renal  Urea Nitrogen (mg/dL)   Date Value   08/26/2020 15   08/25/2020 20   08/24/2020 23     Creatinine (mg/dL)   Date Value   08/26/2020 1.04   08/25/2020 1.13 (H)   08/24/2020 1.27 (H)     Additional  Ketones Urine (mg/dL)   Date Value   08/21/2020 Negative        MEDICATIONS  Medications reviewed      acetaminophen  975 mg Oral TID     apixaban ANTICOAGULANT  5 mg Oral BID     aspirin  81 mg Oral Daily     atorvastatin  80 mg Oral QPM     benzocaine 20%         fentaNYL (PF)         furosemide  20 mg Oral Daily     glycopyrrolate         lidocaine   Topical Once    Or     lidocaine  1.5 mL Topical Once "     metoprolol succinate ER  50 mg Oral BID     midazolam         sodium chloride (PF)  3 mL Intracatheter Q8H        Continuing ACE inhibitor/ARB/ARNI from home medication list OR ACE inhibitor/ARB order already placed during this visit       - MEDICATION INSTRUCTIONS -       HEParin 350 Units/hr (08/26/20 1349)     - MEDICATION INSTRUCTIONS -       - MEDICATION INSTRUCTIONS -       sodium chloride        acetaminophen, Continuing ACE inhibitor/ARB/ARNI from home medication list OR ACE inhibitor/ARB order already placed during this visit, - MEDICATION INSTRUCTIONS -, cyclobenzaprine, fentaNYL, lidocaine 4%, lidocaine (buffered or not buffered), magnesium sulfate, magnesium sulfate, magnesium sulfate, - MEDICATION INSTRUCTIONS -, melatonin, metoprolol, naloxone, ondansetron **OR** ondansetron, - MEDICATION INSTRUCTIONS -, polyethylene glycol, potassium chloride ER, potassium chloride ER, senna-docusate **OR** senna-docusate, sodium chloride (PF), sodium chloride (PF), sodium chloride     ASSESSED NUTRITION NEEDS PER APPROVED PRACTICE GUIDELINES:    Dosing Weight 60.6 kg   Estimated Energy Needs: 5892-3055 kcals (25-30 Kcal/Kg)  Justification: maintenance  Estimated Protein Needs: 61-73+ grams protein (1-1.2 g pro/Kg)  Justification: preservation of lean body mass  Estimated Fluid Needs: per MD    MALNUTRITION:  % Weight Loss: unable to determine --> limited weight hx  % Intake:  <75% for > 7 days (non-severe malnutrition) - varying diet order with procedures  Subcutaneous Fat Loss:  None observed  Muscle Loss:  Temporal region mild depletion --> will not use given only one indicator   Fluid Retention: trace     Malnutrition Diagnosis: Unable to determine due to lack of weight history     NUTRITION DIAGNOSIS:  Inadequate oral intake related to variable diet order in the setting of multiple withheld  procedures as evidenced by pt likely consuming <75% of nutritional needs throughout admission     NUTRITION  INTERVENTIONS  Recommendations / Nutrition Prescription  Diet per MD/cardiology   Ordered Boost Plus BID    Implementation  Nutrition education:     Assessed learning needs, learning preferences, and willingness to learn    Nutrition Education (Content):  a) Provided handout on low sodium from the nutrition care manual   b) Discussed low sodium food options while shopping, the allotted amount of Na per day and encouraged more fresh/homemade food items.      Nutrition Education (Application):  a) Discussed eating habits and recommended alternative food choices    Patient verbalizes understanding of diet by asking questions and engaging in conversation     Anticipate good compliance    Diet Education - refer to Education Flowsheet    Medical Food Supplement: as above   Collaboration and Referral of Nutrition care: discussed pt during interdisciplinary rounds this morning    Nutrition Goals  Pt to consume >/=50% of meals ordered TID + 1-2 oral nutritional supplements per day     MONITORING AND EVALUATION:  Progress towards goals will be monitored and evaluated per protocol and Practice Guidelines    Nayana Iqbal, RD, LD  Clinical Dietitian

## 2020-08-26 NOTE — PLAN OF CARE
Patient admitted with SOB and swelling. New CHF and New A ib. Angio yesterday without intervention and KALPESH today without cardioversion due to thrombus in the left atrial appendage. She will start PO anticoag tonight, with stop of the heparin. Fine crackles to bilateral lower lungs. Lasix resumed. Continues to c/o left flank pain off and on. Not consistent. Declines pain medicine for that specific pain. She is on scheduled tylenol. Plan id to discharge tomorrow to home with daughter. Daughter updated and is available for early discharge.

## 2020-08-26 NOTE — PLAN OF CARE
Discharge Planner OT   Patient plan for discharge: Home w/ family support  Current status: Pt tolerating LE spandi  sleeves, educated on removing at night and re-donning in AM, pt prefers to wear at night as well. Reports comfortable fit, sleeves removed and skin checked, no increase in edema noted.  Pt completed sit > stand from chair to FWW with SBA. Pt ambulated in hallway FWW level with CGA ~100 ft. /73, HR 84, O2 sats 96% on RA post activity, ongoing fatigue reported.   Barriers to return to prior living situation: Stairs, decreased functional activity tolerance/strength  Recommendations for discharge: Home w/ family assist for bathing, IADLs (homemaking, laundry, meal prep, etc) and HH PT/OT, HH lymphedema  Rationale for recommendations: Pt is below baseline level of functioning with regards to ADLs/IADLs and mobility tasks, limited by fatigue. Recommend ongoing skilled OT while IP and in HH setting to improve strength, functional activity tolerance and safety needed for daily tasks. HH services indicated as leaving the home environment would require significant effort at this time.

## 2020-08-26 NOTE — PLAN OF CARE
VSS. Pt denies pain. Pt SBA. Rt groin angio performed 8/25, site WDL. Heparin drip restarted at 0030. Tele: A fib. CVR. Plan for cardioversion today. Cardiology and PT following. Will continue with plan of care.

## 2020-08-26 NOTE — PROVIDER NOTIFICATION
MD notified - Hep gtt was stopped at 1900 for nose bleeds. No more bleeds since 1930. Do you want it restarted? Thank you!    Addendum: MD verbally told writer to restart hep gtt. No further bleeding issues tonight.

## 2020-08-26 NOTE — PROGRESS NOTES
[facility]  Hospitalist Progress Note    Assessment & Plan   Patrocinio ROBBIE Mullins is a very pleasant 79 y.o. [place] woman, English speaking with a PMHx including NSTEMI (stenting of a high-grade stenosis in the proximal right coronary artery in 2017), CABG (2010), SVT, peripheral arterial disease with bilateral iliac stents.     Patient presented to the ED on 8/19/2020 with extremity edema, dyspnea and new onset atrial fibrillation with RVR.  Laboratory work-up significant for proBNP 10,189.  Troponin 0 0.289.  Chest x-ray with mild to moderate left pleural effusion and mild pulmonary vascular congestion.    Patient was admitted to the hospital and started on IV Lasix.  She had a increasing creatinine and diuresis was held.  Continue adequate heart rate control with oral metoprolol.  Cardiology was consulted.  8/20 echocardiogram severely reduced systolic function.  Ejection fraction 25 to 30%.  Severe global hypokinesis with minor regional variation.  8/25/2020 coronary angiogram nonischemic cardiomyopathy.  However, she has multiple areas of stenosis amenable to PCI if clinically indicated. 8/26/2020 KALPESH cardioversion completed.      Addendum: KALPESH showed thrombus in the left atrial appendage. Unable to perform cardioversion.     Nonischemic cardiomyopathy, tachycardia induced  CHFrEF with acute decompensation  CAD s/p PCI; CABG  -Cardiology consulted-appreciate assistance-plan for KALPESH cardioversion  -Aspirin 81 mg daily; Plavix 75 mg daily discontinued.  Patient to continue on aspirin and anticoagulant.  -Atorvastatin 80 mg nightly  -Metoprolol 50 mg twice daily  -ACEi  held due to acute kidney injury  -IV diuresis held due to FATOU.  -Daily weights and strict I's and O's    Atrial fibrillation with controlled ventricular rate  -KALPESH cardioversion 8/26.  Heparin GTT with plans to transition to oral Eliquis following cardioversion.  -Recently is on consulted to assist with medication costs on  discharge   -Metoprolol 50 mg twice daily with hold parameters.  Controlling ventricular rate.  -Telemetry monitoring reviewed    Acute kidney injury   Creatinine on admission 1.18.  Peaked creatinine 1.44.  Renal ultrasound unremarkable.  Normal urinalysis with no protein or RBCs.    -Renal function improving.  Continue to hold losartan/Lasix.  Improving.  Daily BMPs    Left flank pain likely musculoskeletal  Stable.  Point tenderness at left paraspinal muscle.  UA unremarkable.  Renal ultrasound unremarkable.  - Encourage conservative therapies with heat/ice.  Repositioning.  Stretching.  Continue to monitor    Bilateral lower extremity edema  -Lymphedema wraps continued.  Bilateral lower extremity ultrasound negative for DVT.    Peripheral arterial disease  -ASA 81 mg daily; Plavix discontinued.     Hypertension  -Hydrochlorothiazide 12.5 mg nightly held.  Losartan 100 mg nightly held due to acute kidney injury.  Patient will likely have hydrochlorothiazide discontinued on discharge due to increase metoprolol dose.    COVID 19  -Negative 8/19    FEN: Oral hydration, monitor, advance diet  Activity: As tolerated - home PT/OT/RN/Lymph consults on discharge  DVT Prophylaxis: Heparin gtt transition to Eliquis 8/26  Code Status: Full Code   Family updated: Patient to update family today.     Expected discharge: Tomorrow, recommended to prior living arrangement once heart rate controlled and blood thinner started.    Nannette Gilbert, DO  Text Page (7am - 6pm, M-F)    Interval History   Doing well today. Denies chest pain or palpitations.  No shortness of breath, cough or congestion.  N.p.o. for procedure.  Have been tolerating diet.  No abdominal pain.  Discussed with nursing.    -Data reviewed today: I reviewed all new labs and imaging results over the last 24 hours. I personally reviewed     Physical Exam   Temp: 97.9  F (36.6  C) Temp src: Oral BP: 113/69 Pulse: 92   Resp: 20 SpO2: 98 % O2 Device: Nasal cannula Oxygen  Delivery: 2 LPM  Vitals:    08/24/20 0608 08/25/20 0528 08/26/20 0401   Weight: 59.3 kg (130 lb 11.2 oz) 60.4 kg (133 lb 3.2 oz) 60.6 kg (133 lb 9.6 oz)     Vital Signs with Ranges  Temp:  [97.5  F (36.4  C)-98.4  F (36.9  C)] 97.9  F (36.6  C)  Pulse:  [72-95] 92  Resp:  [16-24] 20  BP: (104-145)/() 143/82  SpO2:  [93 %-99 %] 99 %  I/O last 3 completed shifts:  In: 600 [P.O.:600]  Out: 325 [Urine:325]    Constitutional: Awake, alert, cooperative, no apparent distress. Non-toxic. Appears stated age.   HEENT: Atraumatic. Normocephalic. Conjunctiva non-injected. Sclera anicteric. MMM.   Respiratory: Moves air bilaterally. Clear to auscultation bilaterally, no crackles or wheezing  Cardiovascular: Irregularly irregular. Rate controlled. Normal S1 and S2, and no murmur noted  GI: Normal bowel sounds, soft, non-distended, non-tender  Skin/Integumen: No rashes, no cyanosis, no edema    Medications     Continuing ACE inhibitor/ARB/ARNI from home medication list OR ACE inhibitor/ARB order already placed during this visit       - MEDICATION INSTRUCTIONS -       HEParin 350 Units/hr (08/26/20 0808)     - MEDICATION INSTRUCTIONS -       - MEDICATION INSTRUCTIONS -       sodium chloride         acetaminophen  975 mg Oral TID     apixaban ANTICOAGULANT  5 mg Oral BID     aspirin  81 mg Oral Daily     atorvastatin  80 mg Oral QPM     benzocaine 20%         fentaNYL (PF)         furosemide  20 mg Oral Daily     glycopyrrolate         lidocaine   Topical Once    Or     lidocaine  1.5 mL Topical Once     metoprolol succinate ER  50 mg Oral BID     midazolam         sodium chloride (PF)  3 mL Intracatheter Q8H     Data   Recent Labs   Lab 08/26/20  0644 08/25/20  0700 08/24/20  0721  08/22/20  0610  08/20/20  0359  08/19/20  1941/20  1538   WBC 5.3 6.9  --   --  5.3  --  4.6   < >  --   --    HGB 11.7 12.2  --   --  13.2  --  13.0   < >  --   --    MCV 93 93  --   --  93  --  92   < >  --   --     163  --   --  190   --  182   < >  --   --    INR 1.12  --   --   --   --   --   --   --   --   --     135 134   < > 136   < > 139  --   --   --    POTASSIUM 4.2 4.2 3.9   < > 3.9   < > 3.3*  --   --   --    CHLORIDE 105 100 101   < > 100   < > 101  --   --   --    CO2 27 31 27   < > 31   < > 28  --   --   --    BUN 15 20 23   < > 19   < > 18  --   --   --    CR 1.04 1.13* 1.27*   < > 1.25*   < > 1.34*  --   --   --    ANIONGAP 5 4 6   < > 5   < > 10  --   --   --    TORRI 8.3* 8.6 8.3*   < > 8.5   < > 8.6  --   --   --    GLC 77 79 78   < > 78   < > 84  --   --   --    ALBUMIN  --   --   --   --   --   --  3.5  --   --   --    PROTTOTAL  --   --   --   --   --   --  7.0  --   --   --    BILITOTAL  --   --   --   --   --   --  3.3*  --   --   --    ALKPHOS  --   --   --   --   --   --  79  --   --   --    ALT  --   --   --   --   --   --  20  --   --   --    AST  --   --   --   --   --   --  29  --   --   --    TROPI  --   --   --   --   --   --   --   --  0.305* 0.301*    < > = values in this interval not displayed.     Recent Results (from the past 24 hour(s))   Cardiac Catheterization    Narrative      Left ventricular filling pressures are normal .    Prox LAD lesion is 30% stenosed.    Mid LAD lesion is 90% stenosed.    1st Diag lesion is 25% stenosed.    Ramus lesion is 20% stenosed.    Prox Cx lesion is 99% stenosed.    Mid Cx lesion is 100% stenosed.    Mid RCA lesion is 45% stenosed.    Dist RCA lesion is 80% stenosed.     1. All grafts open however: The mid body of the Graft to distal RCA has   70% long narrowing. Presumably outside hospital chose to stent the   proximal native RCA rather than the graft, HOWEVER the graft enters the   distal RCA after the distal native 80% lesion so there could be some   reduced flow to the native distal RCA/PL system since stenting the   proximal RCA doesn't help the native distal RCA lesion and the graft to   RCA enters after the native 80% native lesion and there is a lesion (70%)  "  in the graft itself. It is likely that even though there is disease   upstream from the PL system (disease in native and graft) there is dual   supply so this area may not be ischemic. We don't have films from 2017 to   see if this distal native lesion is new.  IN ADDITION the mid OM vessels   are \"trapped as an island of ischemia\" with severe disease in native prox   Lcx antegrade and severe disease retrograde via wide patent graft to   distal Lcx but severe lesion in Lcx above graft insertion. Both of these   areas are small and would not contribute to global drop in EF.  Likely   this cardiomyopathy was due to rapid afib cardiomyopathy. Both of these   areas are amendable to PCI if indicated by clinical angina etc  2. Normal LV filling pressure       "

## 2020-08-26 NOTE — PLAN OF CARE
Neuro: A&Ox4. Forgetful at times   CV: Contined a-fib w/ CVR. 5 beats of v-tach noted on ECG. VSS. Edema increasing slightly in BLEs. PO lasix 20mg given today.  Resp: Fine crackles in lung bases. Some dyspnea w/ exertion.  GI: Poor appetite. No BM today. Bowel sounds hypoactive. PRN miralax and PRN senna given today.  : Voiding  Skin: Ecchymotic  IV: R PIV w/ heparin @ 3.5  Labs: Cr 1.04, K 4.2, Mag 2.3    Shift summary: KALPESH done today showed clots = unable to proceed with cardioversion. Plan to transition pt to PO anticoagulation this evening and stop heparin gtt. Pt to go home with PO anticoagulation therapy. Plan to monitor pt overnight and discharge in the AM.

## 2020-08-26 NOTE — PROGRESS NOTES
[facility]    Cardiology Progress Note    ASSESSMENT:  79-year-old female with coronary disease admitted with new systolic heart failure and A. fib.  She underwent CABG in 2010 with RCA stent in 2017.  Ejection fraction now 25 to 30%.  Angiogram August 25 showed stable coronary disease, no intervention required, cardiomyopathy is nonischemic.    We discussed treatment options of atrial fibrillation.  Suspect that her cardiomyopathy is tachycardia and A. fib induced.  Therefore recommended a KALPESH guided cardioversion which would give her heart best chance of recovering if she were to stay in sinus rhythm.  Risk and benefit of procedure was explained in detail.  She understands and wishes to proceed.    RECOMMENDATIONS:  1.  Nonischemic cardiomyopathy, suspect tachycardia induced  -Continue Toprol  -Lisinopril not given yet due to acute kidney injury  -Cardioversion today    2.  Newly diagnosed atrial fibrillation  -KALPESH guided cardioversion today  -Start Eliquis after cardioversion, currently on heparin drip, pharmacy liaison consult pending to look into cost of NOAC's    3.  Coronary artery disease with previous CABG and stent  -Stable disease  -Continue aspirin 81 mg daily in addition to the Eliquis    Possible discharge this p.m., otherwise likely in a.m.    We will arrange cardiology follow-up.    Surjit Koenig MD  Cardiology - [facility]  Pager:  [phone]  Text Page    Addendum:  KALPESH today shows LA appendage thrombus.  No cardioversion performed.  Will start Eliquis 5mg this evening, discontinue heparin gtt when Eliquis is given.  Start Lasix 20mg PO qday.    Home in AM.    Surjit Koenig MD  Cardiology [facility]  Pager: [phone]  Text Page  August 26, 2020      SUBJECTIVE: Feels good this morning.  No pain at groin site from angiogram yesterday.  No chest pain or shortness of breath.    MEDICATIONS:  Current Facility-Administered Medications   Medication     acetaminophen (TYLENOL)  tablet 650 mg     acetaminophen (TYLENOL) tablet 975 mg     aspirin EC tablet 81 mg     atorvastatin (LIPITOR) tablet 80 mg     Continuing ACE inhibitor/ARB/ARNI from home medication list OR ACE inhibitor/ARB/ARNI order already placed during this visit     Continuing beta blocker from home medication list OR beta blocker order already placed during this visit     cyclobenzaprine (FLEXERIL) tablet 10 mg     heparin 25,000 units in 0.45% NaCl 250 mL ANTICOAGULANT  infusion     HOLD:  Metformin and metformin containing medications if patient received IV contrast with acute kidney injury or severe chronic kidney disease (stage IV or stage V; i.e., eGFR less than 30)     HYDROmorphone (PF) (DILAUDID) injection 0.2 mg     lidocaine (LMX4) cream     lidocaine 1 % 0.1-1 mL     magnesium sulfate 2 g in water intermittent infusion     magnesium sulfate 4 g in 100 mL sterile water (premade)     melatonin tablet 1 mg     metoprolol (LOPRESSOR) injection 5 mg     metoprolol succinate ER (TOPROL-XL) 24 hr tablet 50 mg     midazolam (VERSED) injection 0.5-1 mg     naloxone (NARCAN) injection 0.1-0.4 mg     ondansetron (ZOFRAN-ODT) ODT tab 4 mg    Or     ondansetron (ZOFRAN) injection 4 mg     Patient is already receiving anticoagulation with heparin, enoxaparin (LOVENOX), warfarin (COUMADIN)  or other anticoagulant medication     polyethylene glycol (MIRALAX) Packet 17 g     potassium chloride (KLOR-CON) Packet 20-40 mEq     potassium chloride 10 mEq in 100 mL intermittent infusion with 10 mg lidocaine     potassium chloride 10 mEq in 100 mL sterile water intermittent infusion (premix)     potassium chloride 20 mEq in 50 mL intermittent infusion     potassium chloride ER (KLOR-CON M) CR tablet 20-40 mEq     senna-docusate (SENOKOT-S/PERICOLACE) 8.6-50 MG per tablet 1 tablet    Or     senna-docusate (SENOKOT-S/PERICOLACE) 8.6-50 MG per tablet 2 tablet     sodium chloride (PF) 0.9% PF flush 3 mL     sodium chloride (PF) 0.9% PF  flush 3 mL     sodium chloride 0.9% infusion     sodium chloride 0.9% infusion       ALLERGIES:  Allergies   Allergen Reactions     Lisinopril Cough     Oxycodone        REVIEW OF SYSTEMS:  General: no fatigue, weight loss  Cardiac: per HPI  Respiratory: per HPI  GI: no nausea, emesis, melena  Musculoskeletal: no weakness  Neurologic: no aphasia, paraesthesias  Neuropsychiatric: no depression    PHYSICAL EXAM:  Temp: 98  F (36.7  C) Temp src: Oral BP: 122/61 Pulse: 79   Resp: 16 SpO2: 96 % O2 Device: None (Room air) Oxygen Delivery: 2 LPM  Vital Signs with Ranges  Temp:  [97.5  F (36.4  C)-98.4  F (36.9  C)] 98  F (36.7  C)  Pulse:  [72-95] 79  Resp:  [16-24] 16  BP: (104-145)/() 122/61  SpO2:  [93 %-98 %] 96 %  133 lbs 9.6 oz    Constitutional: awake, alert, Ox3  Eyes: PERRL, sclera nonicteric  ENT: trachea midline  Respiratory: Lungs clear  Cardiovascular: Regular rate, totally irregular rhythm, no murmur, no lower extremity edema  GI: nondistended, nontender, bowel sounds present  Lymph/Hematologic: no lymphadenopathy  Skin: dry, no rash  Musculoskeletal: good muscle tone, strength 5/5 in upper and lower extremities, right femoral groin site is clean, dry, intact, no hematoma  Neurologic: no focal deficits  Neuropsychiatric: appropriate affact    Intake/Output Summary (Last 24 hours) at 8/26/2020 0730  Last data filed at 8/25/2020 2243  Gross per 24 hour   Intake 600 ml   Output 325 ml   Net 275 ml     DATA:  Labs: pending    Tele: afib, rate 80's    Echo: 8-20-20: EF 25-30%, global hypokinesis, moderate RV hypokinesis, 1-2+ TR    Cath: 8-25-20: patient grafts with moderate CAD of the radial graft to RCA, widely patent proximal RCA stent, no PCI needed

## 2020-08-26 NOTE — PRE-PROCEDURE
GENERAL PRE-PROCEDURE:   Procedure:  KALPESH and cardioversion  Date/Time:  8/26/2020 11:13 AM    Written consent obtained?: Yes    Risks and benefits: Risks, benefits and alternatives were discussed    Consent given by:  Patient  Patient states understanding of procedure being performed: Yes    Patient's understanding of procedure matches consent: Yes    Procedure consent matches procedure scheduled: Yes    Expected level of sedation:  Moderate  Appropriately NPO:  Yes  ASA Class:  Class 2- mild systemic disease, no acute problems, no functional limitations  Mallampati  :  Grade 3- soft palate visible, posterior pharyngeal wall not visible  Lungs:  Lungs clear with good breath sounds bilaterally  Heart:  A-fib  History & Physical reviewed:  History and physical reviewed and no updates needed  Statement of review:  I have reviewed the lab findings, diagnostic data, medications, and the plan for sedation

## 2020-08-27 ENCOUNTER — APPOINTMENT (OUTPATIENT)
Dept: OCCUPATIONAL THERAPY | Facility: CLINIC | Age: 80
DRG: 286 | End: 2020-08-27
Payer: COMMERCIAL

## 2020-08-27 VITALS
BODY MASS INDEX: 26.07 KG/M2 | RESPIRATION RATE: 18 BRPM | WEIGHT: 132.8 LBS | OXYGEN SATURATION: 97 % | SYSTOLIC BLOOD PRESSURE: 118 MMHG | DIASTOLIC BLOOD PRESSURE: 66 MMHG | TEMPERATURE: 97.9 F | HEART RATE: 80 BPM | HEIGHT: 60 IN

## 2020-08-27 LAB
ANION GAP SERPL CALCULATED.3IONS-SCNC: 4 MMOL/L (ref 3–14)
BUN SERPL-MCNC: 15 MG/DL (ref 7–30)
CALCIUM SERPL-MCNC: 8 MG/DL (ref 8.5–10.1)
CHLORIDE SERPL-SCNC: 105 MMOL/L (ref 94–109)
CO2 SERPL-SCNC: 28 MMOL/L (ref 20–32)
CREAT SERPL-MCNC: 1.03 MG/DL (ref 0.52–1.04)
ERYTHROCYTE [DISTWIDTH] IN BLOOD BY AUTOMATED COUNT: 15.5 % (ref 10–15)
GFR SERPL CREATININE-BSD FRML MDRD: 52 ML/MIN/{1.73_M2}
GLUCOSE SERPL-MCNC: 81 MG/DL (ref 70–99)
HCT VFR BLD AUTO: 39.3 % (ref 35–47)
HGB BLD-MCNC: 12.2 G/DL (ref 11.7–15.7)
MCH RBC QN AUTO: 28.8 PG (ref 26.5–33)
MCHC RBC AUTO-ENTMCNC: 31 G/DL (ref 31.5–36.5)
MCV RBC AUTO: 93 FL (ref 78–100)
PLATELET # BLD AUTO: 171 10E9/L (ref 150–450)
POTASSIUM SERPL-SCNC: 3.9 MMOL/L (ref 3.4–5.3)
RBC # BLD AUTO: 4.24 10E12/L (ref 3.8–5.2)
SODIUM SERPL-SCNC: 137 MMOL/L (ref 133–144)
WBC # BLD AUTO: 5 10E9/L (ref 4–11)

## 2020-08-27 PROCEDURE — 80048 BASIC METABOLIC PNL TOTAL CA: CPT | Performed by: INTERNAL MEDICINE

## 2020-08-27 PROCEDURE — 25000132 ZZH RX MED GY IP 250 OP 250 PS 637: Performed by: PHYSICIAN ASSISTANT

## 2020-08-27 PROCEDURE — 25000132 ZZH RX MED GY IP 250 OP 250 PS 637: Performed by: INTERNAL MEDICINE

## 2020-08-27 PROCEDURE — 99232 SBSQ HOSP IP/OBS MODERATE 35: CPT | Performed by: INTERNAL MEDICINE

## 2020-08-27 PROCEDURE — 97535 SELF CARE MNGMENT TRAINING: CPT | Mod: GO

## 2020-08-27 PROCEDURE — 85027 COMPLETE CBC AUTOMATED: CPT | Performed by: INTERNAL MEDICINE

## 2020-08-27 PROCEDURE — 99239 HOSP IP/OBS DSCHRG MGMT >30: CPT | Performed by: INTERNAL MEDICINE

## 2020-08-27 PROCEDURE — 36415 COLL VENOUS BLD VENIPUNCTURE: CPT | Performed by: INTERNAL MEDICINE

## 2020-08-27 RX ORDER — LOSARTAN POTASSIUM 25 MG/1
25 TABLET ORAL DAILY
Status: DISCONTINUED | OUTPATIENT
Start: 2020-08-27 | End: 2020-08-27 | Stop reason: HOSPADM

## 2020-08-27 RX ORDER — METOPROLOL SUCCINATE 25 MG/1
50 TABLET, EXTENDED RELEASE ORAL 2 TIMES DAILY
Qty: 60 TABLET | Refills: 0 | Status: SHIPPED | OUTPATIENT
Start: 2020-08-27

## 2020-08-27 RX ORDER — LOSARTAN POTASSIUM 25 MG/1
25 TABLET ORAL DAILY
Qty: 30 TABLET | Refills: 0 | Status: SHIPPED | OUTPATIENT
Start: 2020-08-27

## 2020-08-27 RX ORDER — FUROSEMIDE 20 MG
20 TABLET ORAL DAILY
Qty: 30 TABLET | Refills: 0 | Status: SHIPPED | OUTPATIENT
Start: 2020-08-27

## 2020-08-27 RX ADMIN — ASPIRIN 81 MG: 81 TABLET, COATED ORAL at 09:23

## 2020-08-27 RX ADMIN — ACETAMINOPHEN 975 MG: 325 TABLET, FILM COATED ORAL at 09:23

## 2020-08-27 RX ADMIN — METOPROLOL SUCCINATE 50 MG: 50 TABLET, EXTENDED RELEASE ORAL at 09:23

## 2020-08-27 RX ADMIN — APIXABAN 5 MG: 5 TABLET, FILM COATED ORAL at 09:23

## 2020-08-27 RX ADMIN — FUROSEMIDE 20 MG: 20 TABLET ORAL at 09:23

## 2020-08-27 RX ADMIN — LOSARTAN POTASSIUM 25 MG: 25 TABLET ORAL at 09:23

## 2020-08-27 ASSESSMENT — MIFFLIN-ST. JEOR: SCORE: 1003.88

## 2020-08-27 ASSESSMENT — ACTIVITIES OF DAILY LIVING (ADL)
ADLS_ACUITY_SCORE: 12

## 2020-08-27 NOTE — DISCHARGE SUMMARY
[facility]  Discharge Summary Hospitalist    Date of Admission:  8/19/2020  Date of Discharge:  8/27/2020  Discharging Provider: Nannette Gilbert DO  Date of Service (when I saw the patient): 08/27/20    Discharge Diagnoses   Acute systolic congestive heart failure with severe reduced left ventricular systolic function  Nonischemic cardiomyopathy  Atrial fibrillation with rapid ventricular response  Coronary disease status post CABG status post coronary angiogram PCI  Elevated troponin secondary to demand ischemia  Peripheral vascular disease  Hyperlipidemia    History of Present Illness   Noelle Mullins is a very pleasant 79 y.o. [place] woman, English speaking with a PMHx including NSTEMI (stenting of a high-grade stenosis in the proximal right coronary artery in 2017), CABG (2010), SVT, peripheral arterial disease with bilateral iliac stents. Patient presented to the ED on 8/19/2020 with extremity edema, dyspnea and new onset atrial fibrillation with RVR    Hospital Course   Noelle Mullins was admitted on 8/19/2020.  The following problems were addressed during her hospitalization:    Nonischemic cardiomyopathy; new onset congestive heart failure with reduced ejection fraction with acute exacerbation; coronary artery disease s/p PCI; history of CABG: Patient developed with new onset dyspnea, edema, and atrial fibrillation with RVR.  Cardiology was consulted on admission 8/20 echocardiogram performed showing severely reduced systolic function.  EF 25 to 30%.  Severe global hypokinesis with minor regional variation. Due to abnormal echo coronary angiogram performed on 8/25/2020 showed a nonischemic cardiomyopathy.  However, it was noted that if she has anginal symptoms there are multiple areas of possible amenable stenosis by PCI.  Due to starting anticoagulation for atrial fibrillation with RVR patient was discharged on aspirin 81 mg with anticoagulant.  Plavix 75 mg daily was  discontinued.  Patient was continued on metoprolol 50 mg twice daily  (increased home dose); losartan 25 mg daily (decreased from 100 mg daily); atorvastatin 80 mg nightly.  Chlorthalidone was discontinued and replaced with Lasix.  [facility] follows with [facility] and plans to see for follow-up.  Care plan was discussed with cardiology.  Cardiology updated family.  Home health services ordered on discharge.    Atrial fibrillation with rapid ventricular rate: Patient was admitted to the hospital and started on a heparin infusion.  Rate adequately controlled with metoprolol 50 mg twice daily.  TTE with cardioversion attempted 8/26.  However, left atrial appendage thrombus present and cardioversion not completed.  Patient started on Eliquis.  Patient is continue anticoagulation and follow-up with cardiology as discussed cardioversion in 3 weeks.    Elevated troponin secondary to demand ischemia: Troponin elevation peak at 0.305. Coronary angiogram completed and nonischemic. Troponin elevation secondary to demand ischemia     Acute kidney injury: Patient's creatinine on admission 1.18.  Patient was aggressively diuresed resulting in increase creatinine to peak 1.44.  Renal ultrasound was unremarkable.  No abnormalities on urinalysis.  Renal function improved near baseline with losartan and Lasix held.  Patient had a repeat BMP with PCP    Bilateral lower extremity edema: Lymphedema wraps were continued on admission.  Lower extremity ultrasound was negative for DVT.  Patient to follow-up with lymphedema home therapy    COVID-19: Negative 8/19    Nannette Gilbert, DO    Significant Results and Procedures   8/20 Echocardiogram  8/25 Coronary angiogram  8/26 TTE without cardioversion    Pending Results  None    Code Status   Full Code       Primary Care Physician   Teresita Espino    Physical Exam   Temp: 97.9  F (36.6  C) Temp src: Oral BP: 118/66 Pulse: 80   Resp: 18 SpO2: 97 % O2 Device: None (Room air) Oxygen  Delivery: 2 LPM  Vitals:    08/25/20 0528 08/26/20 0401 08/27/20 0620   Weight: 60.4 kg (133 lb 3.2 oz) 60.6 kg (133 lb 9.6 oz) 60.2 kg (132 lb 12.8 oz)     Vital Signs with Ranges  Temp:  [97.5  F (36.4  C)-98.1  F (36.7  C)] 97.9  F (36.6  C)  Pulse:  [] 80  Resp:  [13-19] 18  BP: (102-136)/() 118/66  SpO2:  [95 %-100 %] 97 %  I/O last 3 completed shifts:  In: 360 [P.O.:360]  Out: 1000 [Urine:1000]    Constitutional: Awake, alert, cooperative, no apparent distress. Non-toxic. Appears stated age.   HEENT: Atraumatic. Normocephalic. Conjunctiva non-injected. Sclera anicteric. MMM.   Respiratory: Moves air bilaterally slightly diminished in b/l lung bases. Clear to auscultation bilaterally, no crackles or wheezing  Cardiovascular: Irregularly irregular. Rate controlled.  Normal S1 and S2, and no murmur noted  GI: Normal bowel sounds, soft, non-distended, non-tender  Skin/Integumen: No rashes, no cyanosis, +1 LE edema    Discharge Disposition   Discharged to home  Condition at discharge: Stable    Consultations This Hospital Stay   PHARMACY TO DOSE HEPARIN  PHARMACY TO DOSE HEPARIN  CORE CLINIC EVALUATION IP CONSULT  OCCUPATIONAL THERAPY ADULT IP CONSULT  NUTRITION SERVICES ADULT IP CONSULT  CARDIOLOGY IP CONSULT  LYMPHEDEMA THERAPY IP CONSULT  CARE COORDINATOR IP CONSULT  PHARMACY IP CONSULT  PHARMACY IP CONSULT  PHARMACY IP CONSULT  PHARMACY LIAISON FOR MEDICATION COVERAGE CONSULT  SMOKING CESSATION PROGRAM IP CONSULT    Time Spent on this Encounter   Nannette CAMILO DO, personally saw the patient today and spent greater than 30 minutes discharging this patient.    Discharge Orders      Home care nursing referral      Home Care PT Referral for Hospital Discharge      Home Care OT Referral for Hospital Discharge      Reason for your hospital stay    Atrial fibrillation     Follow-up and recommended labs and tests     Follow up with primary care provider, Teresita Espino, within 7 days for hospital follow-  up.  The following labs/tests are recommended: BMP.  Follow up with Cardiology in 1-2 weeks. Call primary Cardiology clinic to make appointment.     Activity    Your activity upon discharge: activity as tolerated     When to contact your care team    Call your primary doctor if you have any of the following:  increased shortness of breath, increased swelling or increased pain.     MD face to face encounter    Documentation of Face to Face and Certification for Home Health Services    I certify that patient: Noelle Mullins is under my care and that I, or a nurse practitioner or physician's assistant working with me, had a face-to-face encounter that meets the physician face-to-face encounter requirements with this patient on: 8/27/2020.    This encounter with the patient was in whole, or in part, for the following medical condition, which is the primary reason for home health care: atrial fibrillation with RVR; Cardiomyopathy.    I certify that, based on my findings, the following services are medically necessary home health services: Nursing, Occupational Therapy and Physical Therapy.    My clinical findings support the need for the above services because: Nurse is needed: To assess status after changes in medications or other medical regimen. Occupational Therapy Services are needed to assess and treat cognitive ability and address ADL safety due to impairment: see OT note and Physical Therapy Services are needed to assess and treat the following functional impairments: see PT note.    Further, I certify that my clinical findings support that this patient is homebound (i.e. absences from home require considerable and taxing effort and are for medical reasons or [employer] services or infrequently or of short duration when for other reasons) because: Leaving home is medically contraindicated for the following reason(s): Dyspnea on exertion that makes it so they cannot leave their home for needed services  without clinical deterioration...    Based on the above findings. I certify that this patient is confined to the home and needs intermittent skilled nursing care, physical therapy and/or speech therapy.  The patient is under my care, and I have initiated the establishment of the plan of care.  This patient will be followed by a physician who will periodically review the plan of care.  Physician/Provider to provide follow up care: Teresita Espino    Attending hospital physician (the Medicare certified [place] provider): Nannette Gilbert DO  Physician Signature: See electronic signature associated with these discharge orders.  Date: 8/27/2020     Discharge Instructions    Your Cardiologist will discuss repeating KALPESH with possible cardioversion in 3 weeks.     Full Code     Diet    Follow this diet upon discharge:  Regular Diet Adult     Discharge Medications   Current Discharge Medication List      START taking these medications    Details   apixaban ANTICOAGULANT (ELIQUIS) 5 MG tablet Take 1 tablet (5 mg) by mouth 2 times daily  Qty: 60 tablet, Refills: 0    Associated Diagnoses: Atrial fibrillation with RVR (H)      furosemide (LASIX) 20 MG tablet Take 1 tablet (20 mg) by mouth daily  Qty: 30 tablet, Refills: 0    Associated Diagnoses: Acute heart failure, unspecified heart failure type (H)         CONTINUE these medications which have CHANGED    Details   losartan (COZAAR) 25 MG tablet Take 1 tablet (25 mg) by mouth daily  Qty: 30 tablet, Refills: 0    Associated Diagnoses: Acute heart failure, unspecified heart failure type (H)      metoprolol succinate ER (TOPROL-XL) 25 MG 24 hr tablet Take 2 tablets (50 mg) by mouth 2 times daily  Qty: 60 tablet, Refills: 0    Associated Diagnoses: Atrial fibrillation with RVR (H)         CONTINUE these medications which have NOT CHANGED    Details   acetaminophen (TYLENOL) 325 MG tablet Take 325-650 mg by mouth every 6 hours as needed for mild pain      aspirin 81 MG EC tablet Take  81 mg by mouth daily      atorvastatin (LIPITOR) 80 MG tablet Take 80 mg by mouth every evening      fluticasone (FLONASE) 50 MCG/ACT nasal spray Spray 2 sprays into both nostrils daily      vitamin C (ASCORBIC ACID) 500 MG tablet Take 500 mg by mouth daily         STOP taking these medications       clopidogrel (PLAVIX) 75 MG tablet Comments:   Reason for Stopping:         hydrochlorothiazide (MICROZIDE) 12.5 MG capsule Comments:   Reason for Stopping:             Allergies   Allergies   Allergen Reactions     Lisinopril Cough     Oxycodone      Data   Most Recent 3 CBC's:  Recent Labs   Lab Test 08/27/20  0610 08/26/20  0644 08/25/20  0700   WBC 5.0 5.3 6.9   HGB 12.2 11.7 12.2   MCV 93 93 93    166 163      Most Recent 3 BMP's:  Recent Labs   Lab Test 08/27/20  0610 08/26/20  0644 08/25/20  0700    137 135   POTASSIUM 3.9 4.2 4.2   CHLORIDE 105 105 100   CO2 28 27 31   BUN 15 15 20   CR 1.03 1.04 1.13*   ANIONGAP 4 5 4   TORRI 8.0* 8.3* 8.6   GLC 81 77 79     Most Recent 2 LFT's:  Recent Labs   Lab Test 08/20/20  0359   AST 29   ALT 20   ALKPHOS 79   BILITOTAL 3.3*     Most Recent INR's and Anticoagulation Dosing History:  Anticoagulation Dose History     Recent Dosing and Labs Latest Ref Rng & Units 8/26/2020    INR 0.86 - 1.14 1.12        Most Recent 3 Troponin's:  Recent Labs   Lab Test 08/19/20  1941/20  1538 08/19/20  1110   TROPI 0.305* 0.301* 0.289*     Most Recent Cholesterol Panel:No lab results found.  Most Recent 6 Bacteria Isolates From Any Culture (See EPIC Reports for Culture Details):No lab results found.  Most Recent TSH, T4 and A1c Labs:No lab results found.  Results for orders placed or performed during the hospital encounter of 08/19/20   XR Chest Port 1 View    Narrative    CHEST PORTABLE ONE VIEW 8/19/2020 11:08 AM     HISTORY: Shortness of breath.     COMPARISON: None available    FINDINGS: Sternotomy changes. Aortic calcification. Cardiomegaly.      Impression     IMPRESSION: Mild-moderate left pleural effusion. Underlying left base  consolidation/collapse which could simply represent atelectasis. Mild  pulmonary vascular congestion.    FELICIA RICHARDS MD   US Lower Extremity Venous Duplex Bilateral    Narrative    VENOUS ULTRASOUND BILATERAL LOWER EXTREMITIES  2020 3:28 PM     HISTORY: Edema, rule out DVT.    COMPARISON: None.    TECHNIQUE: Color Doppler and spectral waveform analysis performed  throughout the deep veins of both lower extremities.    FINDINGS: Both common femoral, proximal greater saphenous, femoral,  and popliteal veins demonstrate normal blood flow, compression, and  augmentation. Posterior tibial and peroneal veins are compressible.      Impression    IMPRESSION: Negative for deep venous thrombosis in both lower  extremities.    NIRANJAN GONZALEZ MD   US Renal Complete    Narrative    ULTRASOUND RETROPERITONEAL COMPLETE 2020 11:58 AM     HISTORY: Left flank pain, acute kidney injury.    COMPARISON: None.    FINDINGS: The bilateral renal parenchyma are unremarkable in  echogenicity without evidence for shadowing stone or mass. No renal  cysts. No hydronephrosis. Right kidney measures 8.9 x 3.8 x 4.4 cm and  the left measures 6.7 x 3.5 x 4.5 cm. Cortical thickness is 1.2 cm on  the right and 1.1 cm on the left.  Bladder is unremarkable given its  level of distention.      Impression    IMPRESSION: Negative bilateral renal ultrasound.    HERLINDA MCRAE MD   Echocardiogram Complete    Narrative    [id number]  [id number]  [id number]  [id number]^LYNN^KALA^[facility]  Echocardiography Laboratory  [address] [place] [address]        Name: ALYX OLIVER  MRN: [id number]  : 1940  Study Date: 2020 11:20 AM  Age: 79 yrs  Gender: Female  Patient Location: [facility]  Reason For Study: Heart Failure  Ordering Physician: KALA BAILEY  Referring Physician: kim Espino  Performed By: Indra Price RDCS      BSA: 1.6 m2  Height: 60 in  Weight: 143 lb  HR: 94  BP: 121/105 mmHg  _____________________________________________________________________________  __        Procedure  Complete Portable Echo Adult. Optison (NDC #[id number]) given intravenously.  _____________________________________________________________________________  __        Interpretation Summary     The left ventricle is normal in size. There is normal left ventricular wall  thickness. Left ventricular systolic function is severely reduced. The visual  ejection fraction is estimated at 25-30%. Diastolic function not assessed due  to atrial fibrillation. There is severe global hypokinesis with minor regional  variation. There is no thrombus seen in the left ventricle.  The right ventricle is normal size. Moderately decreased right ventricular  systolic function.  The left atrium is moderately dilated.  Mild to moderate tricuspid regurgitation.  No pericardial effusion.  No previous study for comparison.  _____________________________________________________________________________  __        Left Ventricle  The left ventricle is normal in size. There is normal left ventricular wall  thickness. Left ventricular systolic function is severely reduced. The visual  ejection fraction is estimated at 25-30%. Diastolic function not assessed due  to atrial fibrillation. There is severe global hypokinesis with minor regional  variation. There is no thrombus seen in the left ventricle.     Right Ventricle  The right ventricle is normal size. Moderately decreased right ventricular  systolic function.     Atria  The left atrium is moderately dilated. Right atrial size is normal. There is  no color Doppler evidence of an atrial shunt.     Mitral Valve  The mitral valve leaflets are mildly thickened. There is mild (1+) mitral  regurgitation.        Tricuspid Valve  There is mild to moderate (1-2+) tricuspid regurgitation. The right  ventricular systolic pressure is  approximated at 23.5 mmHg plus the right  atrial pressure.     Aortic Valve  There is mild trileaflet aortic sclerosis. No aortic regurgitation is present.  No aortic stenosis is present.     Pulmonic Valve  There is no pulmonic valvular stenosis.     Vessels  The aortic root is normal size. Normal size ascending aorta. The inferior vena  cava is normal.     Pericardium  There is no pericardial effusion. Small left pleural effusion.        Rhythm  The rhythm was atrial fibrillation.  _____________________________________________________________________________  __  MMode/2D Measurements & Calculations  IVSd: 0.92 cm     LVIDd: 5.3 cm  LVIDs: 4.5 cm  LVPWd: 0.98 cm  FS: 14.8 %  LV mass(C)d: 188.1 grams  LV mass(C)dI: 116.2 grams/m2  Ao root diam: 2.7 cm  LA dimension: 4.3 cm  asc Aorta Diam: 3.0 cm  LA/Ao: 1.6  LVOT diam: 1.8 cm  LVOT area: 2.4 cm2  LA Volume (BP): 89.0 ml  RWT: 0.37        Doppler Measurements & Calculations  MV E max refugio: 125.4 cm/sec  MV dec time: 0.18 sec  TR max refugio: 239.4 cm/sec  TR max P.5 mmHg  E/E' av.6  Lateral E/e': 14.8  Medial E/e': 18.4              _____________________________________________________________________________  __           Report approved by: Chrissie Singer 2020 12:53 PM      Transesophageal Echocardiogram    Narrative    [id number]  [facility]  [id number]  [id number]^FERNANDA^PB^UGO           [facility]  Echocardiography Laboratory  [address] [place] [address]        Name: ALYX OLIVER  MRN: [id number]  : 1940  Study Date: 2020 11:19 AM  Age: 79 yrs  Gender: Female  Patient Location: [facility]  Reason For Study: Afib  Ordering Physician: PB MICHAEL  Referring Physician: Teresita Espino MD  Performed By: Radha Lovett RDCS     BSA: 1.6 m2  Height: 60 in  Weight: 133 lb  HR: 91  BP: 112/81 mmHg  _____________________________________________________________________________  __         Procedure  Complete KALPESH Adult.  _____________________________________________________________________________  __        Interpretation Summary     There is a thrombus seen in the left atrial appendage.  The left atrial appendage thrombus measures 1.1 x 0.68 cm.  There is moderate (2+) mitral regurgitation.  There is moderate (2+) tricuspid regurgitation.  The KALPESH probe would not pass into the stomach and this may represent a  stricture at the gastro esophageal junction  The visual ejection fraction is estimated at 25-30%.  Left ventricular systolic function is moderate to severely reduced.  _____________________________________________________________________________  __        KALPESH  I determined this patient to be an appropriate candidate for the planned  sedation and procedure and have reassessed the patient immediately prior to  sedation and procedure. Total sedation time: 10 min minutes of continuous  bedside 1:1 monitoring. Versed (1mg) was given intravenously. Fentanyl (25mcg)  was given intravenously. Consent to the procedure was obtained prior to  sedation. Prior to the exam, the oral cavity was checked and no overcrowding  was noted. The transesophageal probe was passed without difficulty. The KALPESH  probe would not pass into the stomach and this may represent a stricture at  the gastro esophagealjunction.     Left Ventricle  The left ventricle is normal in size. The visual ejection fraction is  estimated at 25-30%. Left ventricular systolic function is moderate to  severely reduced. There is mod-severe global hypokinesia of the left  ventricle.     Right Ventricle  The right ventricle is normal size. Moderately decreased right ventricular  systolic function.     Atria  The left atrium is moderately dilated. The right atrium is mildly dilated. No  evidence of right atrial mass/thrombus. There is no color Doppler evidence of  an atrial shunt. A contrast injection (Bubble Study) was performed that  was  negative for flow across the interatrial septum. There is a thrombus seen in  the left atrial appendage. The left atrial appendage thrombus measures 1.1 x  0.68 cm.        Mitral Valve  There is mild mitral annular calcification. There is moderate (2+) mitral  regurgitation. The mitral regurgitant jet is eccentrically directed. The  mitral regurgitant jet is directed along the lateral wall of the left atrium.     Tricuspid Valve  There is moderate (2+) tricuspid regurgitation. The right ventricular systolic  pressure is approximated at 20.1 mmHg plus the right atrial pressure.     Aortic Valve  The aortic valve is trileaflet. There is mild trileaflet aortic sclerosis.  There is trace aortic regurgitation. No hemodynamically significant valvular  aortic stenosis.     Pulmonic Valve  There is trace pulmonic valvular regurgitation. Normal pulmonic valve  velocity.     Vessels  The aortic root is normal size. (2.9 cm in diameter). Moderate atherosclerotic  plaque(s) in the descending aorta. Moderate atherosclerotic plaque(s) in the  aortic arch. Normal pulmonary venous drainage.        Pericardial/Pleural  There is no pericardial effusion.     Rhythm  The rhythm was atrial fibrillation.  _____________________________________________________________________________  __           Doppler Measurements & Calculations  TR max refugio: 224.1 cm/sec  TR max P.1 mmHg           _____________________________________________________________________________  __           Report approved by: Chrissie He 2020 12:38 PM      Cardiac Catheterization    Narrative      Left ventricular filling pressures are normal .    Prox LAD lesion is 30% stenosed.    Mid LAD lesion is 90% stenosed.    1st Diag lesion is 25% stenosed.    Ramus lesion is 20% stenosed.    Prox Cx lesion is 99% stenosed.    Mid Cx lesion is 100% stenosed.    Mid RCA lesion is 45% stenosed.    Dist RCA lesion is 80% stenosed.     1. All grafts open  "however: The mid body of the Graft to distal RCA has   70% long narrowing. Presumably outside hospital chose to stent the   proximal native RCA rather than the graft, HOWEVER the graft enters the   distal RCA after the distal native 80% lesion so there could be some   reduced flow to the native distal RCA/PL system since stenting the   proximal RCA doesn't help the native distal RCA lesion and the graft to   RCA enters after the native 80% native lesion and there is a lesion (70%)   in the graft itself. It is likely that even though there is disease   upstream from the PL system (disease in native and graft) there is dual   supply so this area may not be ischemic. We don't have films from 2017 to   see if this distal native lesion is new.  IN ADDITION the mid OM vessels   are \"trapped as an island of ischemia\" with severe disease in native prox   Lcx antegrade and severe disease retrograde via wide patent graft to   distal Lcx but severe lesion in Lcx above graft insertion. Both of these   areas are small and would not contribute to global drop in EF.  Likely   this cardiomyopathy was due to rapid afib cardiomyopathy. Both of these   areas are amendable to PCI if indicated by clinical angina etc  2. Normal LV filling pressure         "

## 2020-08-27 NOTE — PLAN OF CARE
End of Shift Note:  For VS and complete assessments, please see documentation flowsheets.    COVID status: Negative.     Pertinent shift assessments: VSS. Pt AxOx4. Tele: A fib CVR, occasional PVCs. Pt denies pain. SBA, walker used. LS diminished. Compression stockings on LE. Trace edema bilat LE. 3g Na diet maintained. Will continue with plan of care.    Expected Discharge Date/Disposition: Pt to discharge today to home with daughter.

## 2020-08-27 NOTE — PLAN OF CARE
Pt discharging to home w/ daughter. VSS. Denies pain. Groin site WNL. K 3.9  this AM, spoke with MD Dr. Gilbert who states no need to replace potassium prior to discharge. Pt continues in a-fib w/CVR, going home on PO anticoagulant w/ plan to followup with cardiology within 7 days. CHF booklet and education given and reviewed with pt. All questions answered. PIV removed. Pt discharged with AVS and all belongings. New scrips sent to pt pharmacy by MD.  Heart Failure Care Map  GOALS TO BE MET BEFORE DISCHARGE:    1. Decrease congestion and/or edema with diuretic therapy to achieve near optimal volume status.     Dyspnea improved: Yes, satisfactory for discharge.   Edema improved: Yes, satisfactory for discharge.        Net I/O and Weights since admission:   07/28 1500 - 08/27 1459  In: 4178.85 [P.O.:3410; I.V.:768.85]  Out: 5475 [Urine:5075]  Net: -1296.15     Vitals:    08/19/20 1248 08/21/20 0648 08/22/20 0625 08/23/20 0653   Weight: 65.2 kg (143 lb 11.8 oz) 61 kg (134 lb 6.4 oz) 59.9 kg (132 lb 1.6 oz) 61.2 kg (135 lb)    08/24/20 0608 08/25/20 0528 08/26/20 0401 08/27/20 0620   Weight: 59.3 kg (130 lb 11.2 oz) 60.4 kg (133 lb 3.2 oz) 60.6 kg (133 lb 9.6 oz) 60.2 kg (132 lb 12.8 oz)       2.  O2 sats > 92% on room air, or at prior home O2 therapy level.      Able to wean O2 this shift to keep sats above 92%?: Yes, satisfactory for discharge.   Does patient use Home O2? No          Current oxygenation status:   SpO2: 97 %     O2 Device: None (Room air), Oxygen Delivery: 2 LPM    3.  Tolerates ambulation and mobility near baseline.     Ambulation: Pt going home with home care.   Times patient ambulated with staff this shift: 2    Please review the Heart Failure Care Map for additional HF goal outcomes.    SUHA COLLADO, RN  8/27/2020

## 2020-08-27 NOTE — PROGRESS NOTES
Discharge Planner   Discharge Plans in progress: The pt will discharge home today with [facility].  Anders spoke with the [facility] liaison to update her on the pt's discharge today.  The [facility] liaison will update the [facility] team.  HC PT/OT were recommended for discharge, but there were no HC orders on the discharge orders.    Anders paged the physician at 0815 requesting HC be added to the discharge orders.       08/27/20 0816   Final Resources   Resources List Home Care   Home Care [facility] [phone], Fax: [phone]     Barriers to discharge plan: None identified at this time.  Follow up plan: Sw will continue to be available as needed until discharge.       Entered by: Chata Huitron 08/27/2020 8:17 AM     TRACY Cortez, [facility]  Inpatient Care Coordination  [facility]  [phone]

## 2020-08-27 NOTE — PROGRESS NOTES
[facility]    Cardiology Progress Note    Date of Service (when I saw the patient): 08/27/2020     Primary cardiologist: Dr Johnson with [facility]    Assessment & Plan   Noelle Mullins is a 79 year old female who was admitted on 8/19/2020 with acute decompensated HF. Her hx includes CAD s/p CABG 2010 (LIMA to LAD, radial artery to RCA, SVG to OM) patent grafts on coronary angiogram 2017 but she underwent proximal RCA native vessel PCI for non-STEMI. Her work up her revealed severely reduced LV systolic function with LVEF 25 to 30% with severe global hypokinesia and newly diagnosed atrial fibrillation with rapid ventricular rate.  She was started on a rate control strategy and started on heparin drip.  She ultimately underwent cor angiography which showed presumed stable coronary disease, no intervention required (see report below). As it appeared her CM was non-ischemic a KALPESH/DCCV was recommended, unfortunately she was found to have a MARC clot on KALPESH so DCCV was unable to be performed.     1.  Newly diagnosed acute systolic congestive heart failure with severe reduced LV systolic function.  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    -Continue aspirin, statin, beta-blocker.  Lasix restarted yesterday (this is replacing her home hydrochlorothiazide). Will restart Losartan 25g daily today (lower dose than PTA).    2.  Newly diagnosed atrial fibrillation with rapid ventricular rate.  On rate control strategy.  Ventricular rates appear well-controlled on current dose of beta-blocker.  [id number] of 5, Eliquis started this stay.   -KALPESH did show MARC clot.   -Would wait at least 3 weeks with uninterrupted AC then consider a repeat KALPESH/attempt at DCCV    3.  Known CAD with the CABG in 2010, coronary angiogram in 2017 showed patent LIMA to LAD, radial to RCA, SVG to OM graft.  She underwent native proximal RCA PCI at that time.   LVEF 55% echo in 2017.  -See above    4.  Peripheral vascular disease with reported history of bilateral iliac artery stenting.    6.  Acute renal failure with fluctuating Cr-resolved. Cr back down to ~1 for the last 2 days.    7. HL  -Per her last cardiology note in 8/2019 she was on atorvastatin 80 mg plus Repatha 140 mg subcutaneous every 2 weeks. Pt states has not been on the repatha for probably close to 6 months.    8. One 5 beat run of VT 8/24. No further episodes noted.   -Toprol XL increased 50mg BID, continue this dose for now.  -Continue with tele    Follow up: Discussed with pt daughter Yeimi. I recommended that she call her primary cardiology clinic and make a follow up in the next 1-2 weeks. She does have follow up with her PCP on Spet 2, recommend a repeat BMP at that time.    Marcia Ray PA-C      Interval History   Feels well. No SOB, GRACIA or CP. LE down/stable    Tele: afib with controlled VR. She did have a 5 beat run of NSVT yesterday AM.    Physical Exam   Temp: 97.9  F (36.6  C) Temp src: Oral BP: 118/66 Pulse: 80   Resp: 18 SpO2: 97 % O2 Device: None (Room air) Oxygen Delivery: 2 LPM  Vitals:    08/25/20 0528 08/26/20 0401 08/27/20 0620   Weight: 60.4 kg (133 lb 3.2 oz) 60.6 kg (133 lb 9.6 oz) 60.2 kg (132 lb 12.8 oz)     Vital Signs with Ranges  Temp:  [97.5  F (36.4  C)-98.1  F (36.7  C)] 97.9  F (36.6  C)  Pulse:  [] 80  Resp:  [13-19] 18  BP: (102-136)/() 118/66  SpO2:  [95 %-100 %] 97 %  I/O last 3 completed shifts:  In: 360 [P.O.:360]  Out: 1000 [Urine:1000]    Constitutional     alert and oriented, in no acute distress.     Lungs  clear to auscultation     Cardiac  irregular rhythm, no murmurs, no rubs    Extremities and Back     Trace LE edema observed.        Neurological     no gross motor deficits noted, affect appropriate, oriented to time, person and place.      Medications     Continuing ACE inhibitor/ARB/ARNI from home medication list OR ACE inhibitor/ARB  order already placed during this visit       - MEDICATION INSTRUCTIONS -       - MEDICATION INSTRUCTIONS -       - MEDICATION INSTRUCTIONS -       sodium chloride         acetaminophen  975 mg Oral TID     apixaban ANTICOAGULANT  5 mg Oral BID     aspirin  81 mg Oral Daily     atorvastatin  80 mg Oral QPM     furosemide  20 mg Oral Daily     lidocaine   Topical Once    Or     lidocaine  1.5 mL Topical Once     metoprolol succinate ER  50 mg Oral BID     sodium chloride (PF)  3 mL Intracatheter Q8H       Data   Most Recent 3 CBC's:  Recent Labs   Lab Test 08/27/20  0610 08/26/20  0644 08/25/20  0700   WBC 5.0 5.3 6.9   HGB 12.2 11.7 12.2   MCV 93 93 93    166 163     Most Recent 3 BMP's:  Recent Labs   Lab Test 08/27/20  0610 08/26/20  0644 08/25/20  0700    137 135   POTASSIUM 3.9 4.2 4.2   CHLORIDE 105 105 100   CO2 28 27 31   BUN 15 15 20   CR 1.03 1.04 1.13*   ANIONGAP 4 5 4   TORRI 8.0* 8.3* 8.6   GLC 81 77 79     Most Recent 3 Troponin's:  Recent Labs   Lab Test 08/19/20  1941/20  1538 08/19/20  1110   TROPI 0.305* 0.301* 0.289*     Most Recent Cholesterol Panel:No lab results found.  Most Recent TSH and T4:No lab results found.  Most Recent Hemoglobin A1c:No lab results found.    Echocardiology:  The left ventricle is normal in size. There is normal left ventricular wall  thickness. Left ventricular systolic function is severely reduced. The visual ejection fraction is estimated at 25-30%. Diastolic function not assessed due to atrial fibrillation. There is severe global hypokinesis with minor regional variation. There is no thrombus seen in the left ventricle.  The right ventricle is normal size. Moderately decreased right ventricular  systolic function.  The left atrium is moderately dilated.  Mild to moderate tricuspid regurgitation.  No pericardial effusion.    Cor angio:  All grafts open however, the mid body of the Graft to distal RCA has 70% long narrowing. Presumably outside hospital  "chose to stent the proximal native RCA rather than the graft, HOWEVER the graft enters the distal RCA after the distal native 80% lesion so there could be some reduced flow to the native distal RCA/PL system since stenting the proximal RCA doesn't help the native distal RCA lesion and the graft to RCA enters after the native 80% native lesion and there is a lesion (70%) in the graft itself. It is likely that even though there is disease upstream from the PL system (disease in native and graft) there is dual supply so this area may not be ischemic. We don't have films from 2017 to see if this distal native lesion is new.  IN ADDITION the mid OM vessels are \"trapped as an island of ischemia\" with severe disease in native prox Lcx antegrade and severe disease retrograde via wide patent graft to distal Lcx but severe lesion in Lcx above graft insertion. Both of these areas are small and would not contribute to global drop in EF.  Likely this cardiomyopathy was due to rapid afib cardiomyopathy. Both of these areas are amendable to PCI if indicated by clinical angina etc  Normal LV filling pressure  "

## 2020-08-27 NOTE — PLAN OF CARE
Discharge Planner OT   Patient plan for discharge: Home w/ family support  Current status: Pt tolerating LE spandi  sleeves, educated on removing at night and re-donning in AM, pt prefers to wear at night as well. Reports comfortable fit, sleeves removed and skin checked, no increase in edema noted.  Pt completed bed mobility supine > sit EOB with SBA. Pt completed sit > stand from EOB to FWW with CGA, ambulated ~125 ft in room/hallway FWW level with CGA. Fatigue reported.   Barriers to return to prior living situation: Stairs, decreased functional activity tolerance/strength  Recommendations for discharge: Home w/ family assist for bathing, IADLs (homemaking, laundry, meal prep, etc) and HH PT/OT,  lymphedema  Rationale for recommendations: Pt is below baseline level of functioning with regards to ADLs/IADLs and mobility tasks, limited by fatigue. Recommend ongoing skilled OT while IP and in HH setting to improve strength, functional activity tolerance and safety needed for daily tasks. HH services indicated as leaving the home environment would require significant effort at this time.     .  Occupational Therapy Discharge Summary    Reason for therapy discharge:    Discharged to home with home therapy.    Progress towards therapy goal(s). See goals on Care Plan in [facility] electronic health record for goal details.  Goals partially met.  Barriers to achieving goals:   discharge from facility.    Therapy recommendation(s):    Continued therapy is recommended.  Rationale/Recommendations:  HH OT eval and treat..

## 2021-03-01 ENCOUNTER — DOCUMENTATION ONLY (OUTPATIENT)
Dept: OTHER | Facility: CLINIC | Age: 81
End: 2021-03-01

## 2021-03-02 ENCOUNTER — HOSPITAL LABORATORY (OUTPATIENT)
Dept: OTHER | Facility: CLINIC | Age: 81
End: 2021-03-02

## 2021-03-03 LAB
GAMMA INTERFERON BACKGROUND BLD IA-ACNC: 0.11 IU/ML
M TB IFN-G CD4+ BCKGRND COR BLD-ACNC: 3.18 IU/ML
M TB TUBERC IFN-G BLD QL: NEGATIVE
MITOGEN IGNF BCKGRD COR BLD-ACNC: 0.03 IU/ML
MITOGEN IGNF BCKGRD COR BLD-ACNC: 0.03 IU/ML

## 2021-03-04 ENCOUNTER — HOSPITAL LABORATORY (OUTPATIENT)
Dept: OTHER | Facility: CLINIC | Age: 81
End: 2021-03-04

## 2021-03-04 LAB
ERYTHROCYTE [DISTWIDTH] IN BLOOD BY AUTOMATED COUNT: 16.2 % (ref 10–15)
HCT VFR BLD AUTO: 33.2 % (ref 35–47)
HGB BLD-MCNC: 10.9 G/DL (ref 11.7–15.7)
MCH RBC QN AUTO: 30.5 PG (ref 26.5–33)
MCHC RBC AUTO-ENTMCNC: 32.8 G/DL (ref 31.5–36.5)
MCV RBC AUTO: 93 FL (ref 78–100)
PLATELET # BLD AUTO: 265 10E9/L (ref 150–450)
RBC # BLD AUTO: 3.57 10E12/L (ref 3.8–5.2)
WBC # BLD AUTO: 5.7 10E9/L (ref 4–11)

## 2021-03-08 ENCOUNTER — HOSPITAL LABORATORY (OUTPATIENT)
Dept: OTHER | Facility: CLINIC | Age: 81
End: 2021-03-08

## 2021-03-08 LAB
ANION GAP SERPL CALCULATED.3IONS-SCNC: 4 MMOL/L (ref 3–14)
BUN SERPL-MCNC: 43 MG/DL (ref 7–30)
CALCIUM SERPL-MCNC: 8.7 MG/DL (ref 8.5–10.1)
CHLORIDE SERPL-SCNC: 99 MMOL/L (ref 94–109)
CO2 SERPL-SCNC: 31 MMOL/L (ref 20–32)
CREAT SERPL-MCNC: 1.17 MG/DL (ref 0.52–1.04)
ERYTHROCYTE [DISTWIDTH] IN BLOOD BY AUTOMATED COUNT: 15.9 % (ref 10–15)
GFR SERPL CREATININE-BSD FRML MDRD: 44 ML/MIN/{1.73_M2}
GLUCOSE SERPL-MCNC: 76 MG/DL (ref 70–99)
HCT VFR BLD AUTO: 32.6 % (ref 35–47)
HGB BLD-MCNC: 10.5 G/DL (ref 11.7–15.7)
MCH RBC QN AUTO: 30.3 PG (ref 26.5–33)
MCHC RBC AUTO-ENTMCNC: 32.2 G/DL (ref 31.5–36.5)
MCV RBC AUTO: 94 FL (ref 78–100)
PLATELET # BLD AUTO: 255 10E9/L (ref 150–450)
POTASSIUM SERPL-SCNC: 4.1 MMOL/L (ref 3.4–5.3)
RBC # BLD AUTO: 3.46 10E12/L (ref 3.8–5.2)
SODIUM SERPL-SCNC: 134 MMOL/L (ref 133–144)
WBC # BLD AUTO: 4.7 10E9/L (ref 4–11)

## 2021-03-09 ENCOUNTER — HOSPITAL LABORATORY (OUTPATIENT)
Dept: OTHER | Facility: CLINIC | Age: 81
End: 2021-03-09

## 2021-03-10 LAB
SARS-COV-2 RNA RESP QL NAA+PROBE: NOT DETECTED
SPECIMEN SOURCE: NORMAL

## 2021-03-22 ENCOUNTER — HOSPITAL LABORATORY (OUTPATIENT)
Dept: OTHER | Facility: CLINIC | Age: 81
End: 2021-03-22

## 2021-03-22 LAB
ANION GAP SERPL CALCULATED.3IONS-SCNC: 7 MMOL/L (ref 3–14)
BUN SERPL-MCNC: 43 MG/DL (ref 7–30)
CALCIUM SERPL-MCNC: 8.4 MG/DL (ref 8.5–10.1)
CHLORIDE SERPL-SCNC: 103 MMOL/L (ref 94–109)
CO2 SERPL-SCNC: 29 MMOL/L (ref 20–32)
CREAT SERPL-MCNC: 1.15 MG/DL (ref 0.52–1.04)
GFR SERPL CREATININE-BSD FRML MDRD: 45 ML/MIN/{1.73_M2}
GLUCOSE SERPL-MCNC: 77 MG/DL (ref 70–99)
POTASSIUM SERPL-SCNC: 4 MMOL/L (ref 3.4–5.3)
SODIUM SERPL-SCNC: 139 MMOL/L (ref 133–144)

## 2021-04-12 ENCOUNTER — HOSPITAL LABORATORY (OUTPATIENT)
Dept: OTHER | Facility: CLINIC | Age: 81
End: 2021-04-12

## 2021-04-12 LAB
ANION GAP SERPL CALCULATED.3IONS-SCNC: 4 MMOL/L (ref 3–14)
BUN SERPL-MCNC: 33 MG/DL (ref 7–30)
CALCIUM SERPL-MCNC: 8.4 MG/DL (ref 8.5–10.1)
CHLORIDE SERPL-SCNC: 102 MMOL/L (ref 94–109)
CO2 SERPL-SCNC: 30 MMOL/L (ref 20–32)
CREAT SERPL-MCNC: 1.17 MG/DL (ref 0.52–1.04)
GFR SERPL CREATININE-BSD FRML MDRD: 44 ML/MIN/{1.73_M2}
GLUCOSE SERPL-MCNC: 78 MG/DL (ref 70–99)
POTASSIUM SERPL-SCNC: 4 MMOL/L (ref 3.4–5.3)
SODIUM SERPL-SCNC: 136 MMOL/L (ref 133–144)

## (undated) DEVICE — DEFIB PRO-PADZ LVP LQD GEL ADULT 8900-2105-01

## (undated) DEVICE — GUIDEWIRE VASC 0.035INX150CM INQWIRE J TIP IQ35F150J3F/A

## (undated) DEVICE — CATH ANGIO INFINITI 3DRC 4FRX100CM 538476

## (undated) DEVICE — INTRO SHEATH 4FRX10CM PINNACLE RSS402

## (undated) DEVICE — WIRE GUIDE 0.035"X260CM SAFE-T-J EXCHANGE G00517

## (undated) DEVICE — CATH DIAG 4FR JL 4.5 538417

## (undated) DEVICE — CATH DIAG 4FR LCB 538472

## (undated) DEVICE — CATH ANGIO INFINITI IM 4FRX100CM 538460

## (undated) DEVICE — MANIFOLD KIT ANGIO AUTOMATED 014613

## (undated) RX ORDER — PROPOFOL 10 MG/ML
INJECTION, EMULSION INTRAVENOUS
Status: DISPENSED
Start: 2020-08-26